# Patient Record
Sex: MALE | Race: WHITE | NOT HISPANIC OR LATINO | Employment: OTHER | URBAN - METROPOLITAN AREA
[De-identification: names, ages, dates, MRNs, and addresses within clinical notes are randomized per-mention and may not be internally consistent; named-entity substitution may affect disease eponyms.]

---

## 2019-11-26 ENCOUNTER — HOSPITAL ENCOUNTER (INPATIENT)
Facility: HOSPITAL | Age: 74
LOS: 1 days | Discharge: HOME | DRG: 352 | End: 2019-11-28
Attending: SURGERY | Admitting: SURGERY
Payer: MEDICARE

## 2019-11-26 ENCOUNTER — ANESTHESIA EVENT (OUTPATIENT)
Dept: SURGERY | Facility: HOSPITAL | Age: 74
DRG: 352 | End: 2019-11-26
Payer: MEDICARE

## 2019-11-26 ENCOUNTER — ANESTHESIA (OUTPATIENT)
Dept: SURGERY | Facility: HOSPITAL | Age: 74
DRG: 352 | End: 2019-11-26
Payer: MEDICARE

## 2019-11-26 DIAGNOSIS — K40.30 INCARCERATED LEFT INGUINAL HERNIA: Primary | ICD-10-CM

## 2019-11-26 LAB
ALBUMIN SERPL BCP-MCNC: 3.9 G/DL (ref 3.5–5.2)
ALP SERPL-CCNC: 85 U/L (ref 55–135)
ALT SERPL W/O P-5'-P-CCNC: 18 U/L (ref 10–44)
ANION GAP SERPL CALC-SCNC: 10 MMOL/L (ref 8–16)
AST SERPL-CCNC: 18 U/L (ref 10–40)
BASOPHILS # BLD AUTO: 0 K/UL (ref 0–0.2)
BASOPHILS NFR BLD: 0 % (ref 0–1.9)
BILIRUB SERPL-MCNC: 0.9 MG/DL (ref 0.1–1)
BUN SERPL-MCNC: 9 MG/DL (ref 8–23)
CALCIUM SERPL-MCNC: 9.5 MG/DL (ref 8.7–10.5)
CHLORIDE SERPL-SCNC: 107 MMOL/L (ref 95–110)
CO2 SERPL-SCNC: 23 MMOL/L (ref 23–29)
CREAT SERPL-MCNC: 0.8 MG/DL (ref 0.5–1.4)
DIFFERENTIAL METHOD: ABNORMAL
EOSINOPHIL # BLD AUTO: 0 K/UL (ref 0–0.5)
EOSINOPHIL NFR BLD: 0 % (ref 0–8)
ERYTHROCYTE [DISTWIDTH] IN BLOOD BY AUTOMATED COUNT: 13 % (ref 11.5–14.5)
EST. GFR  (AFRICAN AMERICAN): >60 ML/MIN/1.73 M^2
EST. GFR  (NON AFRICAN AMERICAN): >60 ML/MIN/1.73 M^2
GLUCOSE SERPL-MCNC: 133 MG/DL (ref 70–110)
HCT VFR BLD AUTO: 38.1 % (ref 40–54)
HGB BLD-MCNC: 12.9 G/DL (ref 14–18)
LYMPHOCYTES # BLD AUTO: 0.5 K/UL (ref 1–4.8)
LYMPHOCYTES NFR BLD: 4 % (ref 18–48)
MCH RBC QN AUTO: 30.5 PG (ref 27–31)
MCHC RBC AUTO-ENTMCNC: 33.9 G/DL (ref 32–36)
MCV RBC AUTO: 90 FL (ref 82–98)
MONOCYTES # BLD AUTO: 0.3 K/UL (ref 0.3–1)
MONOCYTES NFR BLD: 2.5 % (ref 4–15)
NEUTROPHILS # BLD AUTO: 11.2 K/UL (ref 1.8–7.7)
NEUTROPHILS NFR BLD: 93.5 % (ref 38–73)
PLATELET # BLD AUTO: 194 K/UL (ref 150–350)
PMV BLD AUTO: 9.6 FL (ref 9.2–12.9)
POTASSIUM SERPL-SCNC: 3.7 MMOL/L (ref 3.5–5.1)
PROT SERPL-MCNC: 6.6 G/DL (ref 6–8.4)
RBC # BLD AUTO: 4.23 M/UL (ref 4.6–6.2)
SODIUM SERPL-SCNC: 140 MMOL/L (ref 136–145)
WBC # BLD AUTO: 12.02 K/UL (ref 3.9–12.7)

## 2019-11-26 PROCEDURE — 36000706: Performed by: SURGERY

## 2019-11-26 PROCEDURE — 25000003 PHARM REV CODE 250: Performed by: NURSE ANESTHETIST, CERTIFIED REGISTERED

## 2019-11-26 PROCEDURE — 49507 PR REPAIR ING HERNIA,5+Y/O,STRANG: ICD-10-PCS | Mod: LT,,, | Performed by: SURGERY

## 2019-11-26 PROCEDURE — 93010 EKG 12-LEAD: ICD-10-PCS | Mod: ,,, | Performed by: STUDENT IN AN ORGANIZED HEALTH CARE EDUCATION/TRAINING PROGRAM

## 2019-11-26 PROCEDURE — 99219 PR INITIAL OBSERVATION CARE,LEVL II: CPT | Mod: 57,,, | Performed by: SURGERY

## 2019-11-26 PROCEDURE — 96361 HYDRATE IV INFUSION ADD-ON: CPT | Performed by: SURGERY

## 2019-11-26 PROCEDURE — 49507 PRP I/HERN INIT BLOCK >5 YR: CPT | Mod: LT,,, | Performed by: SURGERY

## 2019-11-26 PROCEDURE — 93010 ELECTROCARDIOGRAM REPORT: CPT | Mod: ,,, | Performed by: STUDENT IN AN ORGANIZED HEALTH CARE EDUCATION/TRAINING PROGRAM

## 2019-11-26 PROCEDURE — 63600175 PHARM REV CODE 636 W HCPCS: Performed by: STUDENT IN AN ORGANIZED HEALTH CARE EDUCATION/TRAINING PROGRAM

## 2019-11-26 PROCEDURE — 96374 THER/PROPH/DIAG INJ IV PUSH: CPT | Mod: 59 | Performed by: SURGERY

## 2019-11-26 PROCEDURE — 37000008 HC ANESTHESIA 1ST 15 MINUTES: Performed by: SURGERY

## 2019-11-26 PROCEDURE — C1781 MESH (IMPLANTABLE): HCPCS | Performed by: SURGERY

## 2019-11-26 PROCEDURE — C1729 CATH, DRAINAGE: HCPCS | Performed by: SURGERY

## 2019-11-26 PROCEDURE — 63600175 PHARM REV CODE 636 W HCPCS: Performed by: SURGERY

## 2019-11-26 PROCEDURE — G0378 HOSPITAL OBSERVATION PER HR: HCPCS

## 2019-11-26 PROCEDURE — 80053 COMPREHEN METABOLIC PANEL: CPT

## 2019-11-26 PROCEDURE — 36415 COLL VENOUS BLD VENIPUNCTURE: CPT

## 2019-11-26 PROCEDURE — 71000033 HC RECOVERY, INTIAL HOUR: Performed by: SURGERY

## 2019-11-26 PROCEDURE — S0020 INJECTION, BUPIVICAINE HYDRO: HCPCS | Performed by: SURGERY

## 2019-11-26 PROCEDURE — 85025 COMPLETE CBC W/AUTO DIFF WBC: CPT

## 2019-11-26 PROCEDURE — G0379 DIRECT REFER HOSPITAL OBSERV: HCPCS

## 2019-11-26 PROCEDURE — 37000009 HC ANESTHESIA EA ADD 15 MINS: Performed by: SURGERY

## 2019-11-26 PROCEDURE — 99219 PR INITIAL OBSERVATION CARE,LEVL II: ICD-10-PCS | Mod: 57,,, | Performed by: SURGERY

## 2019-11-26 PROCEDURE — 36000707: Performed by: SURGERY

## 2019-11-26 PROCEDURE — 25000003 PHARM REV CODE 250: Performed by: SURGERY

## 2019-11-26 PROCEDURE — 93005 ELECTROCARDIOGRAM TRACING: CPT

## 2019-11-26 PROCEDURE — 63600175 PHARM REV CODE 636 W HCPCS: Performed by: NURSE ANESTHETIST, CERTIFIED REGISTERED

## 2019-11-26 DEVICE — MESH PARIETEX PROGRIP LEFT: Type: IMPLANTABLE DEVICE | Site: GROIN | Status: FUNCTIONAL

## 2019-11-26 RX ORDER — NEOSTIGMINE METHYLSULFATE 1 MG/ML
INJECTION, SOLUTION INTRAVENOUS
Status: DISCONTINUED | OUTPATIENT
Start: 2019-11-26 | End: 2019-11-26

## 2019-11-26 RX ORDER — LIDOCAINE HCL/PF 100 MG/5ML
SYRINGE (ML) INTRAVENOUS
Status: DISCONTINUED | OUTPATIENT
Start: 2019-11-26 | End: 2019-11-26

## 2019-11-26 RX ORDER — HYDROMORPHONE HYDROCHLORIDE 2 MG/ML
0.5 INJECTION, SOLUTION INTRAMUSCULAR; INTRAVENOUS; SUBCUTANEOUS EVERY 5 MIN PRN
Status: DISCONTINUED | OUTPATIENT
Start: 2019-11-26 | End: 2019-11-26

## 2019-11-26 RX ORDER — SODIUM CHLORIDE, SODIUM LACTATE, POTASSIUM CHLORIDE, CALCIUM CHLORIDE 600; 310; 30; 20 MG/100ML; MG/100ML; MG/100ML; MG/100ML
INJECTION, SOLUTION INTRAVENOUS CONTINUOUS
Status: DISCONTINUED | OUTPATIENT
Start: 2019-11-26 | End: 2019-11-27

## 2019-11-26 RX ORDER — PROPOFOL 10 MG/ML
VIAL (ML) INTRAVENOUS
Status: DISCONTINUED | OUTPATIENT
Start: 2019-11-26 | End: 2019-11-26

## 2019-11-26 RX ORDER — BUPIVACAINE HYDROCHLORIDE 5 MG/ML
INJECTION, SOLUTION EPIDURAL; INTRACAUDAL
Status: DISCONTINUED | OUTPATIENT
Start: 2019-11-26 | End: 2019-11-26 | Stop reason: HOSPADM

## 2019-11-26 RX ORDER — MORPHINE SULFATE 4 MG/ML
2 INJECTION, SOLUTION INTRAMUSCULAR; INTRAVENOUS EVERY 4 HOURS PRN
Status: DISCONTINUED | OUTPATIENT
Start: 2019-11-26 | End: 2019-11-27

## 2019-11-26 RX ORDER — GLYCOPYRROLATE 0.2 MG/ML
INJECTION INTRAMUSCULAR; INTRAVENOUS
Status: DISCONTINUED | OUTPATIENT
Start: 2019-11-26 | End: 2019-11-26

## 2019-11-26 RX ORDER — SODIUM CHLORIDE 0.9 % (FLUSH) 0.9 %
3 SYRINGE (ML) INJECTION
Status: DISCONTINUED | OUTPATIENT
Start: 2019-11-26 | End: 2019-11-26

## 2019-11-26 RX ORDER — FENTANYL CITRATE 50 UG/ML
INJECTION, SOLUTION INTRAMUSCULAR; INTRAVENOUS
Status: DISCONTINUED | OUTPATIENT
Start: 2019-11-26 | End: 2019-11-26

## 2019-11-26 RX ORDER — DEXAMETHASONE SODIUM PHOSPHATE 4 MG/ML
INJECTION, SOLUTION INTRA-ARTICULAR; INTRALESIONAL; INTRAMUSCULAR; INTRAVENOUS; SOFT TISSUE
Status: DISCONTINUED | OUTPATIENT
Start: 2019-11-26 | End: 2019-11-26

## 2019-11-26 RX ORDER — TALC
6 POWDER (GRAM) TOPICAL NIGHTLY PRN
Status: DISCONTINUED | OUTPATIENT
Start: 2019-11-26 | End: 2019-11-28 | Stop reason: HOSPADM

## 2019-11-26 RX ORDER — SODIUM CHLORIDE, SODIUM LACTATE, POTASSIUM CHLORIDE, CALCIUM CHLORIDE 600; 310; 30; 20 MG/100ML; MG/100ML; MG/100ML; MG/100ML
INJECTION, SOLUTION INTRAVENOUS CONTINUOUS PRN
Status: DISCONTINUED | OUTPATIENT
Start: 2019-11-26 | End: 2019-11-26

## 2019-11-26 RX ORDER — ACETAMINOPHEN 500 MG
1000 TABLET ORAL EVERY 8 HOURS
Status: DISCONTINUED | OUTPATIENT
Start: 2019-11-26 | End: 2019-11-28 | Stop reason: HOSPADM

## 2019-11-26 RX ORDER — ACETAMINOPHEN 10 MG/ML
INJECTION, SOLUTION INTRAVENOUS
Status: DISCONTINUED | OUTPATIENT
Start: 2019-11-26 | End: 2019-11-26

## 2019-11-26 RX ORDER — KETOROLAC TROMETHAMINE 30 MG/ML
INJECTION, SOLUTION INTRAMUSCULAR; INTRAVENOUS
Status: DISCONTINUED | OUTPATIENT
Start: 2019-11-26 | End: 2019-11-26

## 2019-11-26 RX ORDER — SODIUM CHLORIDE 0.9 % (FLUSH) 0.9 %
10 SYRINGE (ML) INJECTION
Status: DISCONTINUED | OUTPATIENT
Start: 2019-11-26 | End: 2019-11-28 | Stop reason: HOSPADM

## 2019-11-26 RX ORDER — ROCURONIUM BROMIDE 10 MG/ML
INJECTION, SOLUTION INTRAVENOUS
Status: DISCONTINUED | OUTPATIENT
Start: 2019-11-26 | End: 2019-11-26

## 2019-11-26 RX ORDER — DIPHENHYDRAMINE HYDROCHLORIDE 50 MG/ML
12.5 INJECTION INTRAMUSCULAR; INTRAVENOUS EVERY 6 HOURS PRN
Status: DISCONTINUED | OUTPATIENT
Start: 2019-11-26 | End: 2019-11-26

## 2019-11-26 RX ORDER — SUCCINYLCHOLINE CHLORIDE 20 MG/ML
INJECTION INTRAMUSCULAR; INTRAVENOUS
Status: DISCONTINUED | OUTPATIENT
Start: 2019-11-26 | End: 2019-11-26

## 2019-11-26 RX ORDER — ONDANSETRON 2 MG/ML
INJECTION INTRAMUSCULAR; INTRAVENOUS
Status: DISCONTINUED | OUTPATIENT
Start: 2019-11-26 | End: 2019-11-26

## 2019-11-26 RX ORDER — OXYCODONE HYDROCHLORIDE 5 MG/1
5 TABLET ORAL EVERY 4 HOURS PRN
Status: DISCONTINUED | OUTPATIENT
Start: 2019-11-26 | End: 2019-11-28 | Stop reason: HOSPADM

## 2019-11-26 RX ORDER — ONDANSETRON 2 MG/ML
8 INJECTION INTRAMUSCULAR; INTRAVENOUS EVERY 6 HOURS PRN
Status: DISCONTINUED | OUTPATIENT
Start: 2019-11-26 | End: 2019-11-28 | Stop reason: HOSPADM

## 2019-11-26 RX ORDER — ONDANSETRON 2 MG/ML
4 INJECTION INTRAMUSCULAR; INTRAVENOUS DAILY PRN
Status: DISCONTINUED | OUTPATIENT
Start: 2019-11-26 | End: 2019-11-26

## 2019-11-26 RX ADMIN — PROPOFOL 200 MG: 10 INJECTION, EMULSION INTRAVENOUS at 06:11

## 2019-11-26 RX ADMIN — FENTANYL CITRATE 100 MCG: 50 INJECTION, SOLUTION INTRAMUSCULAR; INTRAVENOUS at 06:11

## 2019-11-26 RX ADMIN — FENTANYL CITRATE 50 MCG: 50 INJECTION, SOLUTION INTRAMUSCULAR; INTRAVENOUS at 06:11

## 2019-11-26 RX ADMIN — ONDANSETRON 8 MG: 2 INJECTION INTRAMUSCULAR; INTRAVENOUS at 05:11

## 2019-11-26 RX ADMIN — SUCCINYLCHOLINE CHLORIDE 120 MG: 20 INJECTION, SOLUTION INTRAMUSCULAR; INTRAVENOUS at 06:11

## 2019-11-26 RX ADMIN — GLYCOPYRROLATE 0.8 MG: 0.2 INJECTION, SOLUTION INTRAMUSCULAR; INTRAVENOUS at 07:11

## 2019-11-26 RX ADMIN — ROCURONIUM BROMIDE 5 MG: 10 INJECTION, SOLUTION INTRAVENOUS at 06:11

## 2019-11-26 RX ADMIN — ONDANSETRON 4 MG: 2 INJECTION, SOLUTION INTRAMUSCULAR; INTRAVENOUS at 07:11

## 2019-11-26 RX ADMIN — FENTANYL CITRATE 50 MCG: 50 INJECTION, SOLUTION INTRAMUSCULAR; INTRAVENOUS at 07:11

## 2019-11-26 RX ADMIN — PROPOFOL 30 MG: 10 INJECTION, EMULSION INTRAVENOUS at 07:11

## 2019-11-26 RX ADMIN — DEXTROSE 2 G: 50 INJECTION, SOLUTION INTRAVENOUS at 06:11

## 2019-11-26 RX ADMIN — SODIUM CHLORIDE, SODIUM LACTATE, POTASSIUM CHLORIDE, AND CALCIUM CHLORIDE: .6; .31; .03; .02 INJECTION, SOLUTION INTRAVENOUS at 06:11

## 2019-11-26 RX ADMIN — GLYCOPYRROLATE 0.2 MG: 0.2 INJECTION, SOLUTION INTRAMUSCULAR; INTRAVENOUS at 06:11

## 2019-11-26 RX ADMIN — KETOROLAC TROMETHAMINE 30 MG: 30 INJECTION, SOLUTION INTRAMUSCULAR; INTRAVENOUS at 07:11

## 2019-11-26 RX ADMIN — DEXAMETHASONE SODIUM PHOSPHATE 8 MG: 4 INJECTION, SOLUTION INTRAMUSCULAR; INTRAVENOUS at 06:11

## 2019-11-26 RX ADMIN — NEOSTIGMINE METHYLSULFATE 5 MG: 1 INJECTION INTRAVENOUS at 07:11

## 2019-11-26 RX ADMIN — SODIUM CHLORIDE, SODIUM LACTATE, POTASSIUM CHLORIDE, AND CALCIUM CHLORIDE: .6; .31; .03; .02 INJECTION, SOLUTION INTRAVENOUS at 05:11

## 2019-11-26 RX ADMIN — LIDOCAINE HYDROCHLORIDE 100 MG: 20 INJECTION, SOLUTION INTRAVENOUS at 06:11

## 2019-11-26 RX ADMIN — ROCURONIUM BROMIDE 45 MG: 10 INJECTION, SOLUTION INTRAVENOUS at 06:11

## 2019-11-26 RX ADMIN — ACETAMINOPHEN 1000 MG: 10 INJECTION, SOLUTION INTRAVENOUS at 06:11

## 2019-11-26 NOTE — NURSING
Patient transported in stretcher downstairs for surgery. IV clean, dry, and intact and saline locked.

## 2019-11-26 NOTE — ANESTHESIA PREPROCEDURE EVALUATION
11/26/2019  Miguel Mckee is a 74 y.o., male presents for hernia repair under GA.    No past medical history on file.  Past Surgical History:   Procedure Laterality Date    APPENDECTOMY      NEPHRECTOMY Left          Anesthesia Evaluation    I have reviewed the Patient Summary Reports.    I have reviewed the Nursing Notes.   I have reviewed the Medications.     Review of Systems  Anesthesia Hx:  No problems with previous Anesthesia   Denies Personal Hx of Anesthesia complications.   Social:  Non-Smoker    Cardiovascular:   Exercise tolerance: good Hypertension hyperlipidemia    Pulmonary:  Pulmonary Normal    Renal/:   BPH S/p left nephrectomy   Hepatic/GI:   GERD, well controlled    Neurological:  Neurology Normal    Endocrine:   Hypothyroidism        Physical Exam  General:  Well nourished    Airway/Jaw/Neck:  Airway Findings: Mouth Opening: Normal Tongue: Normal  General Airway Assessment: Adult  Mallampati: III  Improves to II with phonation.  TM Distance: Normal, at least 6 cm      Dental:  Dental Findings:    Chest/Lungs:  Chest/Lungs Clear    Heart/Vascular:  Heart Findings: Normal       Mental Status:  Mental Status Findings:  Cooperative, Alert and Oriented         Anesthesia Plan  Type of Anesthesia, risks & benefits discussed:  Anesthesia Type:  general  Patient's Preference:   Intra-op Monitoring Plan: standard ASA monitors  Intra-op Monitoring Plan Comments:   Post Op Pain Control Plan: per primary service following discharge from PACU and multimodal analgesia  Post Op Pain Control Plan Comments:   Induction:   IV  Beta Blocker:         Informed Consent: Patient understands risks and agrees with Anesthesia plan.  Questions answered. Anesthesia consent signed with patient.  ASA Score: 3     Day of Surgery Review of History & Physical:  There are no significant changes.  H&P update referred to  the surgeon.         Ready For Surgery From Anesthesia Perspective.

## 2019-11-26 NOTE — NURSING
Patient arrived to floor in stretcher via EMS from [facility]. Patient AAOx4 and denies any pain. IV clean, dry, and intact. VSS. Spouse at bedside. Bed alarm on, bed locked and low, side rails up x2, and call bell in reach. Yellow fall risk band and nonskid socks placed on patient. Patient oriented to room and call santoro usage. Dr. Mathew notified about patient arrival. Telephone orders taken.

## 2019-11-26 NOTE — NURSING
Received report from KEREN Rucker at [facility]. Awaiting patient arrival via EMS to Room 421.

## 2019-11-26 NOTE — H&P
[facility]  INPATIENT H&P    REASON FOR CONSULT/ADMISSION:  Incarcerated left inguinal hernia    HPI: Miguel Mckee is a 74 y.o. male who presented to [facility] with a 1 day history of left inguinal groin pain and nausea vomiting.  In the emergency room they obtained a CT scan which showed incarcerated left inguinal hernia with questionable strangulation and compromise of the small bowel. Transfer was arranged to [facility] for general surgery evaluation.    Upon presentation the patient is complaining of nausea pain in the left groin.  His last bowel movement was yesterday and he does not remember passing flatus.  He has had no p.o. intake today except for taking his home medications this morning.    I have reviewed the patient's chart including prior progress notes, procedures and testing. The patient reports a history of nephrectomy for benign reasons through a left flank incision and an open appendectomy.  He has never had surgery for inguinal hernias in the past.  Had a colonoscopy many years ago which he reports was normal.    ROS:   Review of Systems   Constitutional: Positive for appetite change. Negative for activity change, chills and fever.   HENT: Negative for congestion, nosebleeds and trouble swallowing.    Eyes: Negative for photophobia, discharge and visual disturbance.   Respiratory: Negative for apnea, chest tightness and shortness of breath.    Cardiovascular: Negative for chest pain, palpitations and leg swelling.   Gastrointestinal: Positive for abdominal distention, abdominal pain, constipation and nausea. Negative for vomiting.   Genitourinary: Negative for difficulty urinating, dysuria and hematuria.   Musculoskeletal: Negative for arthralgias, gait problem, neck pain and neck stiffness.   Skin: Negative for color change, pallor, rash and wound.   Neurological: Negative for seizures, syncope and light-headedness.   Psychiatric/Behavioral: Negative for agitation,  behavioral problems and confusion.       PROBLEM LIST:  Patient Active Problem List   Diagnosis    Incarcerated left inguinal hernia         HISTORY  No past medical history on file.    No past surgical history on file.    Social History     Tobacco Use    Smoking status: Not on file   Substance Use Topics    Alcohol use: Not on file    Drug use: Not on file       No family history on file.      MEDS:  No current facility-administered medications on file prior to encounter.      No current outpatient medications on file prior to encounter.       ALLERGIES:  Review of patient's allergies indicates:  No Known Allergies      VITALS:  Temp:  [98.1 °F (36.7 °C)-98.2 °F (36.8 °C)] 98.2 °F (36.8 °C)  Pulse:  [77-93] 80  Resp:  [19-20] 19  SpO2:  [94 %-97 %] 94 %  BP: (139-166)/(66-74) 166/74    No intake/output data recorded.      PHYSICAL EXAM:  Physical Exam   Constitutional: He is oriented to person, place, and time. He appears well-developed and well-nourished. No distress.   HENT:   Head: Normocephalic and atraumatic.   Nose: Nose normal.   Eyes: Conjunctivae and EOM are normal. No scleral icterus.   Neck: Normal range of motion. Neck supple. No tracheal deviation present.   Cardiovascular: Normal rate, regular rhythm and intact distal pulses.   Pulmonary/Chest: Effort normal and breath sounds normal. No stridor. No respiratory distress.   Abdominal: Soft. He exhibits distension (Mild). He exhibits no ascites. There is tenderness ( left lower quadrant, no rebound, no guarding no peritonitis). A hernia (reducible left inguinal hernia with significant tenderness palpation, no overlying skin changes) is present.   Musculoskeletal: Normal range of motion. He exhibits no edema or deformity.   Neurological: He is alert and oriented to person, place, and time. He is not disoriented. He exhibits normal muscle tone.   Skin: Skin is warm and dry. He is not diaphoretic. No erythema.   Psychiatric: He has a normal mood and  affect. His behavior is normal. Judgment and thought content normal.   Vitals reviewed.        LABS:  No results found for: WBC, RBC, HGB, HCT, PLT  No results found for: GLU, NA, K, CL, CO2, BUN, CREATININE, CALCIUM  No results found for: ALT, AST, GGT, ALKPHOS, BILITOT  No results found for: MG, PHOS    STUDIES:  Outside CT images and reports were personally reviewed.    Upon personal review the patient has sigmoid colon herniating through left inguinal hernia defect with some proximal inflammation.  There is no pneumatosis or evidence of perforation.      ASSESSMENT & PLAN:  74 y.o. male with incarcerated left inguinal hernia containing sigmoid colon  - admit to General surgery  - will plan for urgent open left inguinal hernia repair with possible bowel resection  - NPO  - IV fluids  - NG tube  - antiemetics  - SCDs  - incentive spirometer  - plan to restart home medications tomorrow

## 2019-11-26 NOTE — NURSING
Spoke with Dr. Mathew regarding patient going down for surgery without results of xray for NG tube placement. Physician states it's okay for patient to go to surgery with pending xray results.

## 2019-11-27 LAB
ANION GAP SERPL CALC-SCNC: 9 MMOL/L (ref 8–16)
BASOPHILS # BLD AUTO: 0 K/UL (ref 0–0.2)
BASOPHILS NFR BLD: 0 % (ref 0–1.9)
BUN SERPL-MCNC: 11 MG/DL (ref 8–23)
CALCIUM SERPL-MCNC: 9.4 MG/DL (ref 8.7–10.5)
CHLORIDE SERPL-SCNC: 107 MMOL/L (ref 95–110)
CO2 SERPL-SCNC: 23 MMOL/L (ref 23–29)
CREAT SERPL-MCNC: 1 MG/DL (ref 0.5–1.4)
DIFFERENTIAL METHOD: ABNORMAL
EOSINOPHIL # BLD AUTO: 0 K/UL (ref 0–0.5)
EOSINOPHIL NFR BLD: 0 % (ref 0–8)
ERYTHROCYTE [DISTWIDTH] IN BLOOD BY AUTOMATED COUNT: 13.1 % (ref 11.5–14.5)
EST. GFR  (AFRICAN AMERICAN): >60 ML/MIN/1.73 M^2
EST. GFR  (NON AFRICAN AMERICAN): >60 ML/MIN/1.73 M^2
GLUCOSE SERPL-MCNC: 153 MG/DL (ref 70–110)
HCT VFR BLD AUTO: 38 % (ref 40–54)
HGB BLD-MCNC: 12.6 G/DL (ref 14–18)
LYMPHOCYTES # BLD AUTO: 0.2 K/UL (ref 1–4.8)
LYMPHOCYTES NFR BLD: 1.5 % (ref 18–48)
MAGNESIUM SERPL-MCNC: 1.9 MG/DL (ref 1.6–2.6)
MCH RBC QN AUTO: 30.4 PG (ref 27–31)
MCHC RBC AUTO-ENTMCNC: 33.2 G/DL (ref 32–36)
MCV RBC AUTO: 92 FL (ref 82–98)
MONOCYTES # BLD AUTO: 0.4 K/UL (ref 0.3–1)
MONOCYTES NFR BLD: 2.8 % (ref 4–15)
NEUTROPHILS # BLD AUTO: 12.7 K/UL (ref 1.8–7.7)
NEUTROPHILS NFR BLD: 95.7 % (ref 38–73)
PHOSPHATE SERPL-MCNC: 2.9 MG/DL (ref 2.7–4.5)
PLATELET # BLD AUTO: 215 K/UL (ref 150–350)
PMV BLD AUTO: 9.9 FL (ref 9.2–12.9)
POTASSIUM SERPL-SCNC: 4.3 MMOL/L (ref 3.5–5.1)
RBC # BLD AUTO: 4.15 M/UL (ref 4.6–6.2)
SODIUM SERPL-SCNC: 139 MMOL/L (ref 136–145)
WBC # BLD AUTO: 13.25 K/UL (ref 3.9–12.7)

## 2019-11-27 PROCEDURE — 85025 COMPLETE CBC W/AUTO DIFF WBC: CPT

## 2019-11-27 PROCEDURE — 83735 ASSAY OF MAGNESIUM: CPT

## 2019-11-27 PROCEDURE — 11000001 HC ACUTE MED/SURG PRIVATE ROOM

## 2019-11-27 PROCEDURE — 36415 COLL VENOUS BLD VENIPUNCTURE: CPT

## 2019-11-27 PROCEDURE — 94799 UNLISTED PULMONARY SVC/PX: CPT

## 2019-11-27 PROCEDURE — 25000003 PHARM REV CODE 250: Performed by: SURGERY

## 2019-11-27 PROCEDURE — 63600175 PHARM REV CODE 636 W HCPCS: Performed by: SURGERY

## 2019-11-27 PROCEDURE — 80048 BASIC METABOLIC PNL TOTAL CA: CPT

## 2019-11-27 PROCEDURE — 96361 HYDRATE IV INFUSION ADD-ON: CPT | Performed by: SURGERY

## 2019-11-27 PROCEDURE — 84100 ASSAY OF PHOSPHORUS: CPT

## 2019-11-27 RX ORDER — OXYCODONE HYDROCHLORIDE 5 MG/1
5 TABLET ORAL EVERY 6 HOURS PRN
Qty: 20 TABLET | Refills: 0 | Status: SHIPPED | OUTPATIENT
Start: 2019-11-27 | End: 2019-12-04

## 2019-11-27 RX ORDER — LISINOPRIL 20 MG/1
40 TABLET ORAL DAILY
Status: DISCONTINUED | OUTPATIENT
Start: 2019-11-27 | End: 2019-11-28 | Stop reason: HOSPADM

## 2019-11-27 RX ORDER — ACETAMINOPHEN 500 MG
1000 TABLET ORAL EVERY 8 HOURS
Refills: 0 | COMMUNITY
Start: 2019-11-27 | End: 2019-11-30

## 2019-11-27 RX ORDER — AMLODIPINE BESYLATE 5 MG/1
10 TABLET ORAL DAILY
Status: DISCONTINUED | OUTPATIENT
Start: 2019-11-27 | End: 2019-11-28 | Stop reason: HOSPADM

## 2019-11-27 RX ORDER — IBUPROFEN 200 MG
600 TABLET ORAL EVERY 8 HOURS
Refills: 0 | COMMUNITY
Start: 2019-11-27 | End: 2019-11-30

## 2019-11-27 RX ADMIN — ACETAMINOPHEN 1000 MG: 500 TABLET ORAL at 05:11

## 2019-11-27 RX ADMIN — ACETAMINOPHEN 1000 MG: 500 TABLET ORAL at 09:11

## 2019-11-27 RX ADMIN — ACETAMINOPHEN 1000 MG: 500 TABLET ORAL at 01:11

## 2019-11-27 RX ADMIN — IBUPROFEN 600 MG: 400 TABLET ORAL at 01:11

## 2019-11-27 RX ADMIN — IBUPROFEN 600 MG: 400 TABLET ORAL at 07:11

## 2019-11-27 RX ADMIN — SODIUM CHLORIDE, SODIUM LACTATE, POTASSIUM CHLORIDE, AND CALCIUM CHLORIDE: .6; .31; .03; .02 INJECTION, SOLUTION INTRAVENOUS at 05:11

## 2019-11-27 RX ADMIN — AMLODIPINE BESYLATE 10 MG: 5 TABLET ORAL at 01:11

## 2019-11-27 RX ADMIN — LISINOPRIL 40 MG: 20 TABLET ORAL at 01:11

## 2019-11-27 RX ADMIN — IBUPROFEN 600 MG: 400 TABLET ORAL at 09:11

## 2019-11-27 NOTE — PLAN OF CARE
met with patient and significant other, Adriana Downing, [phone],  to complete discharge assessment. Patient was alert and oriented. Prior to admission patient was independent. Patient is currently employed with [employer]. Patient lives with his significant other, Adriana, at [address] [place] [address] , [id number]. Ms. Wright is primary help at home. Additional emergency contact is , Kayla Carrasquillo, step-daughter, [phone]. Patient does have medical equipment in the home (BSC, walker, cane, and bath bench) , no  services.     Patient has no social needs at this time. Patient has no issues affording medications. SW provided patient with discharge brochure and wrote contact information on the whiteboard. SW encouraged patient to contact if any needs or issues arise. Patient verbalized understanding.      11/27/19 1023   Discharge Assessment   Assessment Type Discharge Planning Assessment   Confirmed/corrected address and phone number on facesheet? Yes   Assessment information obtained from? Caregiver;Patient   Prior to hospitilization cognitive status: Alert/Oriented   Prior to hospitalization functional status: Independent   Current cognitive status: Alert/Oriented   Current Functional Status: Independent   Lives With significant other  (Adriana Carrasquillo,significant [phone])   Able to Return to Prior Arrangements yes   Is patient able to care for self after discharge? Yes   Who are your caregiver(s) and their phone number(s)? Adriana Carrasquillo, [phone] (significant other)    Patient's perception of discharge disposition admitted as an inpatient   Readmission Within the Last 30 Days no previous admission in last 30 days   Patient currently being followed by outpatient case management? No   Patient currently receives any other outside agency services? No   Equipment Currently Used at Home walker, rolling;bath bench;cane, straight;bedside commode   Do you have any problems affording any of  your prescribed medications? No   Is the patient taking medications as prescribed? yes   Does the patient have transportation home? Yes   Transportation Anticipated family or friend will provide   Does the patient receive services at the [facility]? No   Discharge Plan A Home   Discharge Plan B Home with family   DME Needed Upon Discharge  none   Patient/Family in Agreement with Plan yes

## 2019-11-27 NOTE — PLAN OF CARE
Patient AAOX4. Plan of care reviewed and patient verbalized understanding. O2 sats are at 97% on room air. No tele. Surgical dressing to lower abd. Dressing clean, dry, and intact. No drainage. Denies any pain. LR infusing at 125mL/hr. Bed in lowest position, call light within reach. Fall precautions maintained. Will continue to monitor.

## 2019-11-27 NOTE — PROGRESS NOTES
Pharmacy New Medication Education    Patient and/or Caregiver ACCEPTED medication education.    Pharmacy has provided education on the name, indication, and possible side effects of the medication(s) prescribed, using teach-back method.     Learners of pharmacy medication education includes:  Patient and spouse    The following medications have also been discussed, during this admission.     Current Facility-Administered Medications   Medication Frequency    acetaminophen tablet 1,000 mg Q8H    ibuprofen tablet 600 mg Q8H    melatonin tablet 6 mg Nightly PRN    ondansetron injection 8 mg Q6H PRN    oxyCODONE immediate release tablet 5 mg Q4H PRN    sodium chloride 0.9% flush 10 mL PRN          Thank you  Mack Hebert, PharmD

## 2019-11-27 NOTE — OP NOTE
DATE OF PROCEDURE: 11/26/2019    PREOPERATIVE DIAGNOSIS:  Incarcerated left inguinal hernia    POSTOPERATIVE DIAGNOSIS:  Incarcerated left indirect inguinal hernia, cord lipoma    PROCEDURE:  Open left inguinal hernia repair with mesh    SURGEON: Xander Mathew M.D    ASSISTANT: None    ANESTHESIA:  General    ESTIMATED BLOOD LOSS:  40 cc    SPECIMEN:  None    CONDITION:  Stable    COMPLICATIONS: None    FINDINGS:   1.  A large indirect left inguinal hernia containing sigmoid colon was present, bowel viable, sac open and attachments to the hernia sac were freed and bowel was able to be reduced, ligation of the sac performed  2.  Large cord lipoma present, high ligation performed  3.  Tension-free repair with Pro  mesh performed    INDICATIONS: The patient is a 74-year-old male presented outside hospital with acute onset of left groin pain with associated nausea vomiting.  He was diagnosed with a left incarcerated hernia on exam and CT scan.  Transfer was arranged to [facility] for general surgery evaluation.  The patient was counseled on his diagnosis and treatment options and agreed to proceed with urgent open left inguinal hernia repair.    PROCEDURE IN DETAIL:  Informed consent was obtained.  The patient taken the operating room placed in supine position where general endotracheal intubation was achieved.  Received 2 g of Ancef.  He was noted have a left inguinal bulge.  We did not attempt to reduce it prior to his surgery in order to evaluate the bowel intraoperatively his left groin and lower abdomen were prepped and draped in typical sterile fashion.  Time-out performed by all members of the operative team.  We injected local anesthetic the made a oblique incision over the left inguinal canal.  Skin was incised sharply and Bovie was used to dissect down through subcutaneous tissue through Radha's fascia until we encountered external oblique aponeurosis.  Crossing veins were cauterized during the  dissection the external oblique aponeurosis was cleared and a stab incision was made in the superior lateral aspect.  Metzenbaum scissors used to elevate the external oblique and transected towards the external ring.  Were then able to clear way the superior and inferior flaps of the external oblique aponeurosis. Patient was noted to have a large hernia containing what appeared to be colon.  He was placed in Trendelenburg position.  There is no obvious turbid fluid or evidence of necrosis that could be visualized through the hernia sac.  We then gained circumferential control of the hernia sac and cord structures at the level of the pubic tubercle.  Penrose drain was placed.  Were then able to separate the hernia sac from the vas deferens in the venous plexus.  These were preserved. It was also noted patient had a large cord lipoma present as well. Once we cleared off the attachments of the sac down to the internal ring carefully incised.  There is no turbid fluid and no evidence of bowel compromise and.  The patient had sigmoid colon which was adhesed into the sac.  We carefully  sharply the colon and its mesentery from the hernia sac.  Once we did this were able to completely reduce it into the peritoneal cavity.  Were then able to perform high ligation of the sac with 2-0 silk tie.  The sac was transected and the stump returned into the peritoneal cavity.  We then performed high ligation of the cord lipoma and a similar fashion. We assured we had adequate hemostasis. The wound was irrigated.  We then used a left sided Pro  preformed mesh.  This was tacked to the pubic tubercle with a 0 Vicryl suture.  His also tacked twice along the ilioinguinal ligament and once on the superior shelving edge.  We assured that the mesh lay flat and have good reapproximation and recreation of the internal ring.  We then closed the external oblique aponeurosis with a running 2 -0 Vicryl suture. Radah's was closed with  interrupted 3-0 Vicryl sutures.  The dermis was reapproximated with 3-0 Vicryl sutures.  Skin was closed with a running 4-0 Monocryl subcuticular stitch and glue.  Pressure dressing was applied to the area postoperatively and supportive underwear was applied.  The testicle was assured to be returned into the scrotum.  Patient was then aroused from sedation extubated taken to recovery room in stable condition have suffered no complications.  All counts correct x2 than the case. I was present scrubbed throughout the case.    DISPO:  To PACU then return to floor

## 2019-11-27 NOTE — PROGRESS NOTES
[facility]  PROGRESS NOTE    HPI: Miguel Mckee is a 74 y.o. male admitted for incarcerated left inguinal hernia.    INTERVAL HISTORY:  Underwent urgent open left inguinal hernia repair with mesh yesterday evening.  Bowel found to be viable.  Overnight patient has done well. Minimal pain.  Tolerating clears without nausea or vomiting.  No flatus or BM.    VITALS:  Temp:  [98.1 °F (36.7 °C)-98.4 °F (36.9 °C)] 98.3 °F (36.8 °C)  Pulse:  [77-93] 81  Resp:  [15-20] 18  SpO2:  [93 %-97 %] 96 %  BP: (120-166)/(58-74) 139/65    I&Os:  I/O last 3 completed shifts:  In: 2568.8 [P.O.:100; I.V.:2468.8]  Out: 410 [Urine:410]    PHYSICAL EXAM:  GEN:  No acute distress, alert orient x3  HEENT:  Anicteric sclera  CV:  Regular rate rhythm  RESP:  Nonlabored breathing  ABD:  Soft, nontender, nondistended, left lower abdomen incision with bandage in place without any soilage  EXT:  No edema    LABS:  CBC:   Recent Labs   Lab 11/27/19  0351   WBC 13.25*   RBC 4.15*   HGB 12.6*   HCT 38.0*      MCV 92   MCH 30.4   MCHC 33.2     BMP:   Recent Labs   Lab 11/27/19  0351   *      K 4.3      CO2 23   BUN 11   CREATININE 1.0   CALCIUM 9.4     Labs within the past 24 hours have been reviewed.      ASSESSMENT & PLAN:  74 y.o. male status post open left inguinal hernia repair for incarcerated hernia  - postoperatively doing well  - advanced from clears to regular diet at lunch, stop IV fluids  - scheduled Tylenol and ibuprofen, p.r.n. pain medication  - encourage incentive spirometer and out of bed to chair  - likely discharge home this afternoon

## 2019-11-27 NOTE — ANESTHESIA POSTPROCEDURE EVALUATION
Anesthesia Post Evaluation    Patient: Miguel Mckee    Procedure(s) Performed: Procedure(s) (LRB):  REPAIR, HERNIA, INGUINAL, WITHOUT HISTORY OF PRIOR REPAIR, AGE 5 YEARS OR OLDER (Left)    Final Anesthesia Type: general    Patient location during evaluation: ICU  Patient participation: Yes- Able to Participate  Level of consciousness: awake and alert  Post-procedure vital signs: reviewed and stable  Pain management: adequate  Airway patency: patent  YAMILEX mitigation strategies: Multimodal analgesia  PONV status at discharge: No PONV  Anesthetic complications: no      Cardiovascular status: hemodynamically stable and blood pressure returned to baseline  Respiratory status: spontaneous ventilation  Hydration status: euvolemic  Follow-up not needed.          Vitals Value Taken Time   /74 11/26/2019  4:14 PM   Temp 36.8 °C (98.2 °F) 11/26/2019  4:14 PM   Pulse 80 11/26/2019  4:14 PM   Resp 19 11/26/2019  4:14 PM   SpO2 94 % 11/26/2019  4:14 PM         No case tracking events are documented in the log.      Pain/Guerline Score: No data recorded

## 2019-11-27 NOTE — ANESTHESIA POSTPROCEDURE EVALUATION
Anesthesia Post Evaluation    Patient: Miguel Mckee    Procedure(s) Performed: Procedure(s) (LRB):  REPAIR, HERNIA, INGUINAL, WITHOUT HISTORY OF PRIOR REPAIR, AGE 5 YEARS OR OLDER (Left)    Final Anesthesia Type: general    Patient location during evaluation: PACU  Patient participation: Yes- Able to Participate  Level of consciousness: awake and alert and oriented  Post-procedure vital signs: reviewed and stable  Pain management: adequate  Airway patency: patent    PONV status at discharge: No PONV  Anesthetic complications: no      Cardiovascular status: blood pressure returned to baseline, hemodynamically stable and stable  Respiratory status: unassisted, spontaneous ventilation and room air  Hydration status: euvolemic  Follow-up not needed.          Vitals Value Taken Time   /65 11/26/2019  8:55 PM   Temp 36.7 °C (98.1 °F) 11/26/2019  8:55 PM   Pulse 80 11/26/2019  8:55 PM   Resp 17 11/26/2019  8:55 PM   SpO2 95 % 11/26/2019  8:55 PM         Event Time     Out of Recovery 20:58:06          Pain/Guerline Score: Guerline Score: 10 (11/26/2019  8:56 PM)

## 2019-11-27 NOTE — PLAN OF CARE
Pt on RA with documented sats. 97. Patient achieving 1000 ml on incentive spirometer. Will continue to monitor.

## 2019-11-28 VITALS
SYSTOLIC BLOOD PRESSURE: 159 MMHG | DIASTOLIC BLOOD PRESSURE: 78 MMHG | RESPIRATION RATE: 20 BRPM | TEMPERATURE: 96 F | WEIGHT: 201.06 LBS | OXYGEN SATURATION: 97 % | HEART RATE: 70 BPM

## 2019-11-28 LAB
ANION GAP SERPL CALC-SCNC: 9 MMOL/L (ref 8–16)
BASOPHILS # BLD AUTO: 0.01 K/UL (ref 0–0.2)
BASOPHILS NFR BLD: 0.1 % (ref 0–1.9)
BUN SERPL-MCNC: 12 MG/DL (ref 8–23)
CALCIUM SERPL-MCNC: 9 MG/DL (ref 8.7–10.5)
CHLORIDE SERPL-SCNC: 105 MMOL/L (ref 95–110)
CO2 SERPL-SCNC: 24 MMOL/L (ref 23–29)
CREAT SERPL-MCNC: 0.9 MG/DL (ref 0.5–1.4)
DIFFERENTIAL METHOD: ABNORMAL
EOSINOPHIL # BLD AUTO: 0 K/UL (ref 0–0.5)
EOSINOPHIL NFR BLD: 0.1 % (ref 0–8)
ERYTHROCYTE [DISTWIDTH] IN BLOOD BY AUTOMATED COUNT: 13.3 % (ref 11.5–14.5)
EST. GFR  (AFRICAN AMERICAN): >60 ML/MIN/1.73 M^2
EST. GFR  (NON AFRICAN AMERICAN): >60 ML/MIN/1.73 M^2
GLUCOSE SERPL-MCNC: 108 MG/DL (ref 70–110)
HCT VFR BLD AUTO: 36.2 % (ref 40–54)
HGB BLD-MCNC: 11.9 G/DL (ref 14–18)
LYMPHOCYTES # BLD AUTO: 0.7 K/UL (ref 1–4.8)
LYMPHOCYTES NFR BLD: 6.5 % (ref 18–48)
MAGNESIUM SERPL-MCNC: 1.9 MG/DL (ref 1.6–2.6)
MCH RBC QN AUTO: 30.5 PG (ref 27–31)
MCHC RBC AUTO-ENTMCNC: 32.9 G/DL (ref 32–36)
MCV RBC AUTO: 93 FL (ref 82–98)
MONOCYTES # BLD AUTO: 0.7 K/UL (ref 0.3–1)
MONOCYTES NFR BLD: 6.9 % (ref 4–15)
NEUTROPHILS # BLD AUTO: 8.8 K/UL (ref 1.8–7.7)
NEUTROPHILS NFR BLD: 86.4 % (ref 38–73)
PHOSPHATE SERPL-MCNC: 2.2 MG/DL (ref 2.7–4.5)
PLATELET # BLD AUTO: 182 K/UL (ref 150–350)
PMV BLD AUTO: 9.7 FL (ref 9.2–12.9)
POTASSIUM SERPL-SCNC: 3.9 MMOL/L (ref 3.5–5.1)
RBC # BLD AUTO: 3.9 M/UL (ref 4.6–6.2)
SODIUM SERPL-SCNC: 138 MMOL/L (ref 136–145)
WBC # BLD AUTO: 10.21 K/UL (ref 3.9–12.7)

## 2019-11-28 PROCEDURE — 36415 COLL VENOUS BLD VENIPUNCTURE: CPT

## 2019-11-28 PROCEDURE — 84100 ASSAY OF PHOSPHORUS: CPT

## 2019-11-28 PROCEDURE — 85025 COMPLETE CBC W/AUTO DIFF WBC: CPT

## 2019-11-28 PROCEDURE — G0008 ADMIN INFLUENZA VIRUS VAC: HCPCS | Performed by: SURGERY

## 2019-11-28 PROCEDURE — 63600175 PHARM REV CODE 636 W HCPCS: Performed by: SURGERY

## 2019-11-28 PROCEDURE — 80048 BASIC METABOLIC PNL TOTAL CA: CPT

## 2019-11-28 PROCEDURE — 25000003 PHARM REV CODE 250: Performed by: SURGERY

## 2019-11-28 PROCEDURE — 90471 IMMUNIZATION ADMIN: CPT | Performed by: SURGERY

## 2019-11-28 PROCEDURE — 90662 IIV NO PRSV INCREASED AG IM: CPT | Performed by: SURGERY

## 2019-11-28 PROCEDURE — 83735 ASSAY OF MAGNESIUM: CPT

## 2019-11-28 RX ADMIN — OXYCODONE HYDROCHLORIDE 5 MG: 5 TABLET ORAL at 09:11

## 2019-11-28 RX ADMIN — IBUPROFEN 600 MG: 400 TABLET ORAL at 05:11

## 2019-11-28 RX ADMIN — LISINOPRIL 40 MG: 20 TABLET ORAL at 09:11

## 2019-11-28 RX ADMIN — INFLUENZA A VIRUS A/MICHIGAN/45/2015 X-275 (H1N1) ANTIGEN (FORMALDEHYDE INACTIVATED), INFLUENZA A VIRUS A/SINGAPORE/INFIMH-16-0019/2016 IVR-186 (H3N2) ANTIGEN (FORMALDEHYDE INACTIVATED), AND INFLUENZA B VIRUS B/MARYLAND/15/2016 BX-69A (A B/COLORADO/6/2017-LIKE VIRUS) ANTIGEN (FORMALDEHYDE INACTIVATED) 0.5 ML: 60; 60; 60 INJECTION, SUSPENSION INTRAMUSCULAR at 11:11

## 2019-11-28 RX ADMIN — ACETAMINOPHEN 1000 MG: 500 TABLET ORAL at 05:11

## 2019-11-28 RX ADMIN — AMLODIPINE BESYLATE 10 MG: 5 TABLET ORAL at 09:11

## 2019-11-28 NOTE — NURSING
Discharge information and education provided, patient voices understanding. IV catheter discontinued, catheter intact. Discharged via wheelchair, wife at bedside. No acute distress noted.

## 2019-11-28 NOTE — PLAN OF CARE
Safety maintained. Q 2 hour rounding for 4Ps. Denies any pain and discomfort. Dressing to Lt Inguinal area C/D/I. Scrotal support in place. VSS. Will continue to monitor.

## 2019-11-28 NOTE — PLAN OF CARE
VN completed admission questions with patient. Plan of care was discussed including VTE prophylaxis of SCDs.  All questions answered.  Education given on safety measures and using call light before getting out of bed by himself. Patient verbalized understanding. Bed locked and in lowest position. Alarm on. Wife at bedside.

## 2019-11-28 NOTE — PLAN OF CARE
11/28/19 1100   Type of Frequent Check   Type Patient Rounds   Safety/Activity   Patient Rounds bed in low position;bed wheels locked;visualized patient   Safety Promotion/Fall Prevention assistive device/personal item within reach;medications reviewed;nonskid shoes/socks when out of bed;side rails raised x 2   Activity Management sitting at edge of bed - L2   Positioning   Body Position positioned/repositioned independently   Pain/Comfort/Sleep   Preferred Pain Scale number (Numeric Rating Pain Scale)   Pain Rating (0-10): Rest 0    Cued into room to complete rounding and discharge on this pt . Pt granted permission to enter. Patient has received discharge instructions. Prescriptions received. Instructions reviewed with pt using teachback method. All questions answered to pt satisfaction. IV access   removed per floor nurse. Transport requested for discharge.

## 2019-11-28 NOTE — PLAN OF CARE
Plan of care reviewed with patient. Patient verbalized understanding. Fall precautions maintained. Bed in lowest position, call light within reach, 2x bed rails, pt wearing slip resistant socks, dressing to L groin CDI. Patient notified to ask staff for assistance and pt verbalized complete understanding.  Will continue to monitor. Pt anticipates discharge.

## 2019-11-28 NOTE — DISCHARGE INSTRUCTIONS
Hernia Repair Surgery (English)      Ibuprofen tablets and capsules (English)      Acetaminophen tablets or caplets (English)      Oxycodone tablets or capsules (English)      Incentive Spirometer, Using an (English)      Influenza Virus Vaccine injection (English)

## 2019-12-01 NOTE — DISCHARGE SUMMARY
[facility]  General Surgery  Discharge Summary      Patient Name: Miguel Mckee  MRN: [id number]  Admission Date: 11/26/2019  Hospital Length of Stay: 1 days  Discharge Date and Time: 11/28/2019  Attending Physician: No att. providers found   Discharging Provider: Xander Mathew Jr, MD  Primary Care Provider: [facility]     HPI: Miguel Mckee is a 74 y.o. male who presented to [facility] with a 1 day history of left inguinal groin pain and nausea vomiting.  In the emergency room they obtained a CT scan which showed incarcerated left inguinal hernia with questionable strangulation and compromise of the small bowel. Transfer was arranged to [facility] for general surgery evaluation.     Upon presentation the patient was complaining of nausea pain in the left groin.  His last bowel movement was yesterday and he does not remember passing flatus.  He has had no p.o. intake today except for taking his home medications this morning.     The patient reports a history of nephrectomy for benign reasons through a left flank incision and an open appendectomy.  He has never had surgery for inguinal hernias in the past.  Had a colonoscopy many years ago which he reports was normal.    Procedure(s) (LRB):  REPAIR, HERNIA, INGUINAL, WITHOUT HISTORY OF PRIOR REPAIR, AGE 5 YEARS OR OLDER (Left)     Hospital Course:  Patient was admitted to General surgery and underwent urgent open left inguinal hernia repair with mesh on 11/26/2019.  At the time patient was found to have portion of the sigmoid colon sliding but the bowel was viable and was able to be reduced.  Postoperatively he was readmitted to the floor.  Patient was advanced to clears which she tolerated well. So meds were restarted.  His pain was well controlled.  His IV fluids were stopped and he was ambulated.  He had not had a bowel movement or pass gas until postop day 2.  His diet was advanced to regular which he  tolerated was subsequently discharged home.    Consults:     Significant Diagnostic Studies: Radiology: CT scan:     Pending Diagnostic Studies:     None        Final Active Diagnoses:    Diagnosis Date Noted POA    PRINCIPAL PROBLEM:  Incarcerated left inguinal hernia [K40.30] 11/26/2019 Yes      Problems Resolved During this Admission:      Discharged Condition: good    Disposition: Home or Self Care    Follow Up:    Patient Instructions:      Diet Adult Regular     Ice to affected area     Lifting restrictions   Order Comments: No lifting greater than 20 lb, no straining, no strenuous activity for 4 weeks     Notify your health care provider if you experience any of the following:  temperature >100.4     Notify your health care provider if you experience any of the following:  persistent nausea and vomiting or diarrhea     Notify your health care provider if you experience any of the following:  severe uncontrolled pain     Notify your health care provider if you experience any of the following:  redness, tenderness, or signs of infection (pain, swelling, redness, odor or green/yellow discharge around incision site)     Notify your health care provider if you experience any of the following:  worsening rash     Remove dressing in 24 hours   Order Comments: - A surgical glue has been placed over your incisions. Please leave the glue in place and do not attempt to remove it.   - It is ok to shower using mild soap and water over the incisions the day after your procedure. Pat dry your incisions. Do not soak in a bath tub or other body of water for 2 weeks or until cleared by your surgeon.   - If you noticed redness, swelling, fever, increasing pain or significant drainage from your wound please call the office or the hospital  after hours.     Shower on day dressing removed (No bath)     Medications:  Reconciled Home Medications:      Medication List      START taking these medications    oxyCODONE 5 MG  immediate release tablet  Commonly known as:  ROXICODONE  Take 1 tablet (5 mg total) by mouth every 6 (six) hours as needed for Pain.        ASK your doctor about these medications    acetaminophen 500 MG tablet  Commonly known as:  TYLENOL  Take 2 tablets (1,000 mg total) by mouth every 8 (eight) hours. for 3 days  Ask about: Should I take this medication?     ibuprofen 200 MG tablet  Commonly known as:  ADVIL,MOTRIN  Take 3 tablets (600 mg total) by mouth every 8 (eight) hours. for 3 days  Ask about: Should I take this medication?            Xander Mathew Jr, MD  General Surgery  [facility]

## 2019-12-26 ENCOUNTER — OFFICE VISIT (OUTPATIENT)
Dept: SURGERY | Facility: CLINIC | Age: 74
End: 2019-12-26
Payer: MEDICARE

## 2019-12-26 VITALS
DIASTOLIC BLOOD PRESSURE: 65 MMHG | HEIGHT: 69 IN | WEIGHT: 208.13 LBS | SYSTOLIC BLOOD PRESSURE: 132 MMHG | TEMPERATURE: 97 F | OXYGEN SATURATION: 96 % | HEART RATE: 89 BPM | BODY MASS INDEX: 30.83 KG/M2

## 2019-12-26 DIAGNOSIS — Z98.890 STATUS POST LEFT INGUINAL HERNIA REPAIR: Primary | ICD-10-CM

## 2019-12-26 DIAGNOSIS — Z87.19 STATUS POST LEFT INGUINAL HERNIA REPAIR: Primary | ICD-10-CM

## 2019-12-26 PROCEDURE — 99999 PR PBB SHADOW E&M-EST. PATIENT-LVL III: ICD-10-PCS | Mod: PBBFAC,,, | Performed by: SURGERY

## 2019-12-26 PROCEDURE — 99213 OFFICE O/P EST LOW 20 MIN: CPT | Mod: PBBFAC,PO | Performed by: SURGERY

## 2019-12-26 PROCEDURE — 99024 POSTOP FOLLOW-UP VISIT: CPT | Mod: POP,,, | Performed by: SURGERY

## 2019-12-26 PROCEDURE — 99999 PR PBB SHADOW E&M-EST. PATIENT-LVL III: CPT | Mod: PBBFAC,,, | Performed by: SURGERY

## 2019-12-26 PROCEDURE — 99024 PR POST-OP FOLLOW-UP VISIT: ICD-10-PCS | Mod: POP,,, | Performed by: SURGERY

## 2019-12-26 NOTE — PROGRESS NOTES
[facility]  Post Op Visit    SUBJECTIVE:  The patient is a 74 y.o. y/o male 3 weeks s/p open LIHR with mesh. Sliding hernia, containing viable sigmoid colon. He denies pain, fevers, chills, nausea, vomiting, diarrhea, or constipation. Eating well with normal appetite and bowel function. Denies redness around or drainage from incisions.    OBJECTIVE:  Vitals:    12/26/19 1106   BP: 132/65   Pulse: 89   Temp: 97 °F (36.1 °C)     GEN: male in NAD  ABD: soft, non-tender, non-distended  INCISIONS: left groin incision healing well without signs of infection    ASSESSMENT/PLAN:  Doing well s/p LIHR with mesh, for incarcerated sliding hernia.   Patient is advised to avoid heavy lifting or strenuous activity for another 3 weeks.   Follow-up PRN.   All questions answered; patient is comfortable with the above follow-up plan.    Diamond Hall MD  General Surgery, PGY-IV

## 2019-12-26 NOTE — LETTER
December 26, 2019    Miguel Mckee  [address]  [place] [address]         [facility]  [address]  [facility] [address]  Phone: [phone] December 26, 2019     Patient: Miguel Mckee   YOB: 1945   Date of Visit: 12/26/2019       To Whom It May Concern:    It is my medical opinion that Miguel Mckee may return to work on 12/27/19. If possible avoid lifting greater than 20 lbs until 1/9/20.    If you have any questions or concerns, please don't hesitate to call.    Sincerely,        Xander Mathew Jr., MD

## 2021-07-28 ENCOUNTER — OUTSIDE PLACE OF SERVICE (OUTPATIENT)
Dept: CARDIOLOGY | Facility: CLINIC | Age: 76
End: 2021-07-28
Payer: MEDICARE

## 2021-07-28 DIAGNOSIS — R55 SYNCOPE AND COLLAPSE: ICD-10-CM

## 2021-07-28 PROCEDURE — 93010 ELECTROCARDIOGRAM REPORT: CPT | Mod: ,,, | Performed by: INTERNAL MEDICINE

## 2021-07-28 PROCEDURE — 93010 PR ELECTROCARDIOGRAM REPORT: ICD-10-PCS | Mod: ,,, | Performed by: INTERNAL MEDICINE

## 2025-01-17 ENCOUNTER — HOSPITAL ENCOUNTER (INPATIENT)
Facility: HOSPITAL | Age: 80
LOS: 3 days | Discharge: HOME | DRG: 280 | End: 2025-01-20
Attending: EMERGENCY MEDICINE | Admitting: INTERNAL MEDICINE
Payer: MEDICARE

## 2025-01-17 DIAGNOSIS — E87.1 HYPONATREMIA: ICD-10-CM

## 2025-01-17 DIAGNOSIS — R06.02 SHORTNESS OF BREATH: ICD-10-CM

## 2025-01-17 DIAGNOSIS — I50.9 ACUTE DECOMPENSATED HEART FAILURE: ICD-10-CM

## 2025-01-17 DIAGNOSIS — I21.4 NSTEMI (NON-ST ELEVATION MYOCARDIAL INFARCTION): ICD-10-CM

## 2025-01-17 DIAGNOSIS — I24.9 ACUTE CORONARY SYNDROME: Primary | ICD-10-CM

## 2025-01-17 DIAGNOSIS — I21.3 STEMI (ST ELEVATION MYOCARDIAL INFARCTION): ICD-10-CM

## 2025-01-17 DIAGNOSIS — R07.9 CHEST PAIN: ICD-10-CM

## 2025-01-17 DIAGNOSIS — I21.4 NON-ST ELEVATION MYOCARDIAL INFARCTION (NSTEMI): ICD-10-CM

## 2025-01-17 PROBLEM — J96.01 ACUTE HYPOXIC RESPIRATORY FAILURE: Status: ACTIVE | Noted: 2025-01-17

## 2025-01-17 PROBLEM — I10 PRIMARY HYPERTENSION: Status: ACTIVE | Noted: 2025-01-17

## 2025-01-17 LAB
ABO + RH BLD: NORMAL
ALBUMIN SERPL BCP-MCNC: 3.5 G/DL (ref 3.5–5.2)
ALLENS TEST: ABNORMAL
ALP SERPL-CCNC: 86 U/L (ref 40–150)
ALT SERPL W/O P-5'-P-CCNC: 31 U/L (ref 10–44)
ANION GAP SERPL CALC-SCNC: 9 MMOL/L (ref 8–16)
APICAL FOUR CHAMBER EJECTION FRACTION: 46 %
APICAL TWO CHAMBER EJECTION FRACTION: 39 %
APTT PPP: 31 SEC (ref 21–32)
APTT PPP: 70 SEC (ref 21–32)
AST SERPL-CCNC: 49 U/L (ref 10–40)
AV INDEX (PROSTH): 0.92
AV MEAN GRADIENT: 3 MMHG
AV PEAK GRADIENT: 5 MMHG
AV VALVE AREA BY VELOCITY RATIO: 3.8 CM²
AV VALVE AREA: 3.5 CM²
AV VELOCITY RATIO: 1
BILIRUB SERPL-MCNC: 0.7 MG/DL (ref 0.1–1)
BLD GP AB SCN CELLS X3 SERPL QL: NORMAL
BNP SERPL-MCNC: 1110 PG/ML (ref 0–99)
BSA FOR ECHO PROCEDURE: 2.06 M2
BUN SERPL-MCNC: 13 MG/DL (ref 8–23)
CALCIUM SERPL-MCNC: 8.8 MG/DL (ref 8.7–10.5)
CHLORIDE SERPL-SCNC: 95 MMOL/L (ref 95–110)
CHOLEST SERPL-MCNC: 174 MG/DL (ref 120–199)
CHOLEST/HDLC SERPL: 3 {RATIO} (ref 2–5)
CO2 SERPL-SCNC: 23 MMOL/L (ref 23–29)
CREAT SERPL-MCNC: 0.9 MG/DL (ref 0.5–1.4)
CV ECHO LV RWT: 0.41 CM
DOP CALC AO PEAK VEL: 1.1 M/S
DOP CALC AO VTI: 17.8 CM
DOP CALC LVOT AREA: 3.8 CM2
DOP CALC LVOT DIAMETER: 2.2 CM
DOP CALC LVOT PEAK VEL: 1.1 M/S
DOP CALC LVOT STROKE VOLUME: 62.3 CM3
DOP CALC MV VTI: 17.1 CM
DOP CALCLVOT PEAK VEL VTI: 16.4 CM
E WAVE DECELERATION TIME: 103 MSEC
E/A RATIO: 0.46
E/E' RATIO: 8 M/S
ECHO LV POSTERIOR WALL: 1 CM (ref 0.6–1.1)
ERYTHROCYTE [DISTWIDTH] IN BLOOD BY AUTOMATED COUNT: 11.8 % (ref 11.5–14.5)
EST. GFR  (NO RACE VARIABLE): >60 ML/MIN/1.73 M^2
ESTIMATED AVG GLUCOSE: 103 MG/DL (ref 68–131)
FIO2: 40 %
FRACTIONAL SHORTENING: 20.4 % (ref 28–44)
GLUCOSE SERPL-MCNC: 108 MG/DL (ref 70–110)
HBA1C MFR BLD: 5.2 % (ref 4–5.6)
HCT VFR BLD AUTO: 39.6 % (ref 40–54)
HDLC SERPL-MCNC: 58 MG/DL (ref 40–75)
HDLC SERPL: 33.3 % (ref 20–50)
HGB BLD-MCNC: 14 G/DL (ref 14–18)
HR MV ECHO: 93 BPM
INR PPP: 1 (ref 0.8–1.2)
INTERVENTRICULAR SEPTUM: 1.7 CM (ref 0.6–1.1)
IVC DIAMETER: 1.69 CM
LACTATE SERPL-SCNC: 0.9 MMOL/L (ref 0.5–2.2)
LDLC SERPL CALC-MCNC: 104 MG/DL (ref 63–159)
LEFT ATRIUM AREA SYSTOLIC (APICAL 2 CHAMBER): 12.9 CM2
LEFT ATRIUM AREA SYSTOLIC (APICAL 4 CHAMBER): 9.08 CM2
LEFT ATRIUM VOLUME INDEX MOD: 12 ML/M2
LEFT ATRIUM VOLUME MOD: 24 ML
LEFT INTERNAL DIMENSION IN SYSTOLE: 3.9 CM (ref 2.1–4)
LEFT VENTRICLE DIASTOLIC VOLUME INDEX: 53.89 ML/M2
LEFT VENTRICLE DIASTOLIC VOLUME: 111.02 ML
LEFT VENTRICLE END DIASTOLIC VOLUME APICAL 2 CHAMBER: 118.44 ML
LEFT VENTRICLE END DIASTOLIC VOLUME APICAL 4 CHAMBER: 125.33 ML
LEFT VENTRICLE END SYSTOLIC VOLUME APICAL 2 CHAMBER: 32.06 ML
LEFT VENTRICLE END SYSTOLIC VOLUME APICAL 4 CHAMBER: 16.4 ML
LEFT VENTRICLE MASS INDEX: 130.1 G/M2
LEFT VENTRICLE SYSTOLIC VOLUME INDEX: 32 ML/M2
LEFT VENTRICLE SYSTOLIC VOLUME: 65.85 ML
LEFT VENTRICULAR INTERNAL DIMENSION IN DIASTOLE: 4.9 CM (ref 3.5–6)
LEFT VENTRICULAR MASS: 267.9 G
LV LATERAL E/E' RATIO: 8.2 M/S
LV SEPTAL E/E' RATIO: 8.2 M/S
LVED V (TEICH): 111.02 ML
LVES V (TEICH): 65.85 ML
LVOT MG: 3.13 MMHG
LVOT MV: 0.85 CM/S
MAGNESIUM SERPL-MCNC: 1.8 MG/DL (ref 1.6–2.6)
MCH RBC QN AUTO: 33.2 PG (ref 27–31)
MCHC RBC AUTO-ENTMCNC: 35.4 G/DL (ref 32–36)
MCV RBC AUTO: 94 FL (ref 82–98)
MV A" WAVE DURATION": 110.37 MSEC
MV MEAN GRADIENT: 2 MMHG
MV PEAK A VEL: 0.9 M/S
MV PEAK E VEL: 0.41 M/S
MV PEAK GRADIENT: 5 MMHG
MV STENOSIS PRESSURE HALF TIME: 29.72 MS
MV VALVE AREA BY CONTINUITY EQUATION: 3.64 CM2
MV VALVE AREA P 1/2 METHOD: 7.4 CM2
NONHDLC SERPL-MCNC: 116 MG/DL
OHS CV RV/LV RATIO: 0.71 CM
PCO2 BLDA: 35.9 MMHG (ref 35–45)
PEEP: 5
PH SMN: 7.42 [PH] (ref 7.35–7.45)
PISA TR MAX VEL: 2.3 M/S
PLATELET # BLD AUTO: 157 K/UL (ref 150–450)
PMV BLD AUTO: 9.9 FL (ref 9.2–12.9)
PO2 BLDA: 164 MMHG (ref 80–100)
POC BASE DEFICIT: -1 MMOL/L (ref -2–2)
POC HCO3: 23.1 MMOL/L (ref 24–28)
POC PERFORMED BY: ABNORMAL
POC SATURATED O2: >100 % (ref 95–100)
POC SET RR: 14
POCT GLUCOSE: 120 MG/DL (ref 70–110)
POTASSIUM SERPL-SCNC: 4 MMOL/L (ref 3.5–5.1)
PROT SERPL-MCNC: 6.6 G/DL (ref 6–8.4)
PROTHROMBIN TIME: 11.9 SEC (ref 9–12.5)
PV MV: 0.75 M/S
PV PEAK GRADIENT: 4 MMHG
PV PEAK VELOCITY: 1.01 M/S
RA PRESSURE ESTIMATED: 3 MMHG
RA VOL SYS: 19.31 ML
RBC # BLD AUTO: 4.22 M/UL (ref 4.6–6.2)
RIGHT ATRIAL AREA: 9.1 CM2
RIGHT ATRIUM VOLUME AREA LENGTH APICAL 4 CHAMBER: 17.51 ML
RIGHT VENTRICLE DIASTOLIC BASEL DIMENSION: 3.5 CM
RV TB RVSP: 5 MMHG
RV TISSUE DOPPLER FREE WALL SYSTOLIC VELOCITY 1 (APICAL 4 CHAMBER VIEW): 14.05 CM/S
SINUS: 3.43 CM
SODIUM SERPL-SCNC: 127 MMOL/L (ref 136–145)
SPECIMEN OUTDATE: NORMAL
SPECIMEN SOURCE: ABNORMAL
STJ: 3.15 CM
TDI LATERAL: 0.05 M/S
TDI SEPTAL: 0.05 M/S
TDI: 0.05 M/S
TR MAX PG: 21 MMHG
TRICUSPID ANNULAR PLANE SYSTOLIC EXCURSION: 1.48 CM
TRIGL SERPL-MCNC: 60 MG/DL (ref 30–150)
TROPONIN I SERPL DL<=0.01 NG/ML-MCNC: 0.87 NG/ML (ref 0–0.03)
TROPONIN I SERPL DL<=0.01 NG/ML-MCNC: 0.89 NG/ML (ref 0–0.03)
TROPONIN I SERPL DL<=0.01 NG/ML-MCNC: 0.9 NG/ML (ref 0–0.03)
TROPONIN I SERPL DL<=0.01 NG/ML-MCNC: 0.94 NG/ML (ref 0–0.03)
TROPONIN I SERPL DL<=0.01 NG/ML-MCNC: 0.95 NG/ML (ref 0–0.03)
TV REST PULMONARY ARTERY PRESSURE: 24 MMHG
VT: 10
WBC # BLD AUTO: 4.29 K/UL (ref 3.9–12.7)
Z-SCORE OF LEFT VENTRICULAR DIMENSION IN END DIASTOLE: -2.39
Z-SCORE OF LEFT VENTRICULAR DIMENSION IN END SYSTOLE: 0.19

## 2025-01-17 PROCEDURE — 20000000 HC ICU ROOM

## 2025-01-17 PROCEDURE — 85730 THROMBOPLASTIN TIME PARTIAL: CPT | Performed by: EMERGENCY MEDICINE

## 2025-01-17 PROCEDURE — 94761 N-INVAS EAR/PLS OXIMETRY MLT: CPT | Mod: XB

## 2025-01-17 PROCEDURE — 82803 BLOOD GASES ANY COMBINATION: CPT

## 2025-01-17 PROCEDURE — 27000190 HC CPAP FULL FACE MASK W/VALVE

## 2025-01-17 PROCEDURE — 96374 THER/PROPH/DIAG INJ IV PUSH: CPT | Mod: 59

## 2025-01-17 PROCEDURE — 84484 ASSAY OF TROPONIN QUANT: CPT | Performed by: EMERGENCY MEDICINE

## 2025-01-17 PROCEDURE — 25000003 PHARM REV CODE 250: Performed by: INTERNAL MEDICINE

## 2025-01-17 PROCEDURE — 36415 COLL VENOUS BLD VENIPUNCTURE: CPT | Performed by: INTERNAL MEDICINE

## 2025-01-17 PROCEDURE — 83880 ASSAY OF NATRIURETIC PEPTIDE: CPT | Performed by: EMERGENCY MEDICINE

## 2025-01-17 PROCEDURE — 85730 THROMBOPLASTIN TIME PARTIAL: CPT | Mod: 91 | Performed by: INTERNAL MEDICINE

## 2025-01-17 PROCEDURE — 93010 ELECTROCARDIOGRAM REPORT: CPT | Mod: ,,, | Performed by: STUDENT IN AN ORGANIZED HEALTH CARE EDUCATION/TRAINING PROGRAM

## 2025-01-17 PROCEDURE — 80061 LIPID PANEL: CPT | Performed by: INTERNAL MEDICINE

## 2025-01-17 PROCEDURE — 80053 COMPREHEN METABOLIC PANEL: CPT | Performed by: EMERGENCY MEDICINE

## 2025-01-17 PROCEDURE — 27000221 HC OXYGEN, UP TO 24 HOURS

## 2025-01-17 PROCEDURE — 63600175 PHARM REV CODE 636 W HCPCS: Performed by: INTERNAL MEDICINE

## 2025-01-17 PROCEDURE — 86850 RBC ANTIBODY SCREEN: CPT | Performed by: INTERNAL MEDICINE

## 2025-01-17 PROCEDURE — 99285 EMERGENCY DEPT VISIT HI MDM: CPT | Mod: 25

## 2025-01-17 PROCEDURE — 83605 ASSAY OF LACTIC ACID: CPT | Performed by: EMERGENCY MEDICINE

## 2025-01-17 PROCEDURE — 25500020 PHARM REV CODE 255: Performed by: INTERNAL MEDICINE

## 2025-01-17 PROCEDURE — 83036 HEMOGLOBIN GLYCOSYLATED A1C: CPT | Performed by: INTERNAL MEDICINE

## 2025-01-17 PROCEDURE — 5A09457 ASSISTANCE WITH RESPIRATORY VENTILATION, 24-96 CONSECUTIVE HOURS, CONTINUOUS POSITIVE AIRWAY PRESSURE: ICD-10-PCS | Performed by: INTERNAL MEDICINE

## 2025-01-17 PROCEDURE — 94660 CPAP INITIATION&MGMT: CPT

## 2025-01-17 PROCEDURE — 96365 THER/PROPH/DIAG IV INF INIT: CPT

## 2025-01-17 PROCEDURE — 96375 TX/PRO/DX INJ NEW DRUG ADDON: CPT

## 2025-01-17 PROCEDURE — 83735 ASSAY OF MAGNESIUM: CPT | Performed by: EMERGENCY MEDICINE

## 2025-01-17 PROCEDURE — 85610 PROTHROMBIN TIME: CPT | Performed by: EMERGENCY MEDICINE

## 2025-01-17 PROCEDURE — 84484 ASSAY OF TROPONIN QUANT: CPT | Mod: 91 | Performed by: INTERNAL MEDICINE

## 2025-01-17 PROCEDURE — 63600175 PHARM REV CODE 636 W HCPCS: Performed by: EMERGENCY MEDICINE

## 2025-01-17 PROCEDURE — 99900035 HC TECH TIME PER 15 MIN (STAT)

## 2025-01-17 PROCEDURE — 99291 CRITICAL CARE FIRST HOUR: CPT | Mod: 25,,, | Performed by: INTERNAL MEDICINE

## 2025-01-17 PROCEDURE — 85027 COMPLETE CBC AUTOMATED: CPT | Performed by: EMERGENCY MEDICINE

## 2025-01-17 PROCEDURE — 36600 WITHDRAWAL OF ARTERIAL BLOOD: CPT

## 2025-01-17 PROCEDURE — 93005 ELECTROCARDIOGRAM TRACING: CPT

## 2025-01-17 PROCEDURE — 36415 COLL VENOUS BLD VENIPUNCTURE: CPT | Mod: XB | Performed by: INTERNAL MEDICINE

## 2025-01-17 RX ORDER — ATORVASTATIN CALCIUM 40 MG/1
80 TABLET, FILM COATED ORAL DAILY
Status: DISCONTINUED | OUTPATIENT
Start: 2025-01-18 | End: 2025-01-20

## 2025-01-17 RX ORDER — ONDANSETRON 8 MG/1
8 TABLET, ORALLY DISINTEGRATING ORAL EVERY 8 HOURS PRN
Status: DISCONTINUED | OUTPATIENT
Start: 2025-01-17 | End: 2025-01-20 | Stop reason: HOSPADM

## 2025-01-17 RX ORDER — METOPROLOL TARTRATE 25 MG/1
25 TABLET, FILM COATED ORAL 2 TIMES DAILY
Status: DISCONTINUED | OUTPATIENT
Start: 2025-01-17 | End: 2025-01-20

## 2025-01-17 RX ORDER — TALC
6 POWDER (GRAM) TOPICAL NIGHTLY PRN
Status: DISCONTINUED | OUTPATIENT
Start: 2025-01-18 | End: 2025-01-20 | Stop reason: HOSPADM

## 2025-01-17 RX ORDER — CLOPIDOGREL BISULFATE 75 MG/1
75 TABLET ORAL DAILY
Status: DISCONTINUED | OUTPATIENT
Start: 2025-01-18 | End: 2025-01-20

## 2025-01-17 RX ORDER — FUROSEMIDE 10 MG/ML
80 INJECTION INTRAMUSCULAR; INTRAVENOUS EVERY 12 HOURS
Status: DISCONTINUED | OUTPATIENT
Start: 2025-01-17 | End: 2025-01-18

## 2025-01-17 RX ORDER — FUROSEMIDE 10 MG/ML
80 INJECTION INTRAMUSCULAR; INTRAVENOUS
Status: COMPLETED | OUTPATIENT
Start: 2025-01-17 | End: 2025-01-17

## 2025-01-17 RX ORDER — POLYETHYLENE GLYCOL 3350 17 G/17G
17 POWDER, FOR SOLUTION ORAL DAILY
Status: DISCONTINUED | OUTPATIENT
Start: 2025-01-18 | End: 2025-01-20 | Stop reason: HOSPADM

## 2025-01-17 RX ORDER — NITROGLYCERIN 0.4 MG/1
0.4 TABLET SUBLINGUAL EVERY 5 MIN PRN
Status: DISCONTINUED | OUTPATIENT
Start: 2025-01-17 | End: 2025-01-20 | Stop reason: HOSPADM

## 2025-01-17 RX ORDER — HEPARIN SODIUM,PORCINE/D5W 25000/250
0-40 INTRAVENOUS SOLUTION INTRAVENOUS CONTINUOUS
Status: DISCONTINUED | OUTPATIENT
Start: 2025-01-17 | End: 2025-01-20

## 2025-01-17 RX ORDER — ACETAMINOPHEN 325 MG/1
650 TABLET ORAL EVERY 8 HOURS PRN
Status: DISCONTINUED | OUTPATIENT
Start: 2025-01-17 | End: 2025-01-20 | Stop reason: HOSPADM

## 2025-01-17 RX ORDER — NAPROXEN SODIUM 220 MG/1
324 TABLET, FILM COATED ORAL ONCE
Status: COMPLETED | OUTPATIENT
Start: 2025-01-17 | End: 2025-01-17

## 2025-01-17 RX ORDER — ASPIRIN 81 MG/1
81 TABLET ORAL DAILY
Status: DISCONTINUED | OUTPATIENT
Start: 2025-01-18 | End: 2025-01-20 | Stop reason: HOSPADM

## 2025-01-17 RX ADMIN — FUROSEMIDE 80 MG: 10 INJECTION, SOLUTION INTRAMUSCULAR; INTRAVENOUS at 04:01

## 2025-01-17 RX ADMIN — METOPROLOL TARTRATE 25 MG: 25 TABLET, FILM COATED ORAL at 09:01

## 2025-01-17 RX ADMIN — FUROSEMIDE 80 MG: 10 INJECTION, SOLUTION INTRAMUSCULAR; INTRAVENOUS at 09:01

## 2025-01-17 RX ADMIN — ASPIRIN 81 MG CHEWABLE TABLET 324 MG: 81 TABLET CHEWABLE at 07:01

## 2025-01-17 RX ADMIN — HEPARIN SODIUM 12 UNITS/KG/HR: 10000 INJECTION, SOLUTION INTRAVENOUS at 04:01

## 2025-01-17 RX ADMIN — HUMAN ALBUMIN MICROSPHERES AND PERFLUTREN 0.11 MG: 10; .22 INJECTION, SOLUTION INTRAVENOUS at 05:01

## 2025-01-17 NOTE — ED PROVIDER NOTES
Encounter Date: 1/17/2025       History     Chief Complaint   Patient presents with    Chest Pain     [facility] transfer from [facility], given 600 mg plavix and 324 mg asa pta.      Miguel Mckee is a 79 y.o. male who  has a past medical history of CHF (congestive heart failure), Coronary artery disease, Hypertension, and Thyroid disease.    The patient presents to the ED from [facility] as a transfer for STEMI.  Patient reports having chest pain a couple of days ago.  The patient was accepted by Dr. Hernandez who is at bedside.  Patient appears in respiratory distress with shortness of breath tachypnea accessory muscle use.  He has pulmonary edema.  Patient was placed on BiPAP by Dr. Perla and given 80 mg of IV Lasix.  Patient denies any pain at this time.  He received aspirin 324 and Plavix 600 mg prior to transfer.    The history is provided by the patient and medical records.     Review of patient's allergies indicates:  No Known Allergies  Past Medical History:   Diagnosis Date    CHF (congestive heart failure)     Coronary artery disease     Hypertension     Thyroid disease      Past Surgical History:   Procedure Laterality Date    APPENDECTOMY      NEPHRECTOMY Left      No family history on file.  Social History     Tobacco Use    Smoking status: Former     Types: Cigarettes   Substance Use Topics    Alcohol use: Yes     Comment: 2 cans beer/day     Review of Systems   Constitutional:  Negative for fever.   HENT:  Negative for sore throat.    Respiratory:  Positive for shortness of breath.    Cardiovascular:  Negative for chest pain.   Gastrointestinal:  Negative for nausea.   Genitourinary:  Negative for dysuria.   Musculoskeletal:  Negative for back pain.   Skin:  Negative for rash.   Neurological:  Negative for weakness.   Hematological:  Does not bruise/bleed easily.       Physical Exam     Initial Vitals   BP Pulse Resp Temp SpO2   01/17/25 1608 01/17/25 1600 01/17/25 1600 01/17/25 1602 01/17/25  1602   (!) 149/73 93 20 97.8 °F (36.6 °C) 95 %      MAP       --                Physical Exam    Nursing note and vitals reviewed.  Constitutional: He appears well-developed and well-nourished. He is not diaphoretic. No distress.   HENT:   Head: Normocephalic and atraumatic.   Eyes: Conjunctivae are normal.   Cardiovascular:  Regular rhythm and intact distal pulses.           Pulmonary/Chest: No respiratory distress. He has rales.   On BiPAP   Abdominal: He exhibits no distension.     Neurological: He is alert.   Skin: Skin is warm and dry. Capillary refill takes less than 2 seconds. No rash noted.   Psychiatric: He has a normal mood and affect.         ED Course   Procedures  Labs Reviewed   COMPREHENSIVE METABOLIC PANEL - Abnormal       Result Value    Sodium 127 (*)     Potassium 4.0      Chloride 95      CO2 23      Glucose 108      BUN 13      Creatinine 0.9      Calcium 8.8      Total Protein 6.6      Albumin 3.5      Total Bilirubin 0.7      Alkaline Phosphatase 86      AST 49 (*)     ALT 31      eGFR >60      Anion Gap 9     TROPONIN I - Abnormal    Troponin I 0.935 (*)    CBC WITHOUT DIFFERENTIAL - Abnormal    WBC 4.29      RBC 4.22 (*)     Hemoglobin 14.0      Hematocrit 39.6 (*)     MCV 94      MCH 33.2 (*)     MCHC 35.4      RDW 11.8      Platelets 157      MPV 9.9     B-TYPE NATRIURETIC PEPTIDE - Abnormal    BNP 1,110 (*)    LACTIC ACID, PLASMA    Lactate (Lactic Acid) 0.9     APTT   B-TYPE NATRIURETIC PEPTIDE   CBC W/ AUTO DIFFERENTIAL   COMPREHENSIVE METABOLIC PANEL   MAGNESIUM   PROTIME-INR   TROPONIN I   APTT   PROTIME-INR   APTT    aPTT 31.0     PROTIME-INR    Prothrombin Time 11.9      INR 1.0     MAGNESIUM    Magnesium 1.8       EKG Readings: (Independently Interpreted)   EKG at 3:59 p.m.: Rate 94.  Sinus rhythm.  ST elevations in V2 V3 with reciprocal change suspected anterior STEMI.  QTC is prolonged.   Other EKG Interpretations: EKG: Rate 99.  Sinus rhythm with ST elevations in V2 V3 with  reciprocal change.  STEMI.  Consistent with previous EKG.       Imaging Results              X-Ray Chest AP Portable (Final result)  Result time 01/17/25 16:56:44      Final result by Keyur Riggs MD (01/17/25 16:56:44)                   Impression:      1. Chronic appearing interstitial findings, superimposed edema is a consideration.      Electronically signed by: Keyur Riggs MD  Date:    01/17/2025  Time:    16:56               Narrative:    EXAMINATION:  XR CHEST AP PORTABLE    CLINICAL HISTORY:  Shortness of breath    TECHNIQUE:  Single frontal view of the chest was performed.    COMPARISON:  11/26/2019    FINDINGS:  The cardiomediastinal silhouette is not enlarged noting calcification of the aorta..  There is no pleural effusion.  Prominent epicardial fat likely accounts for attenuation along the right upper pleural space rather than pleural effusion..  The trachea is midline.  The lungs are symmetrically expanded bilaterally with coarse interstitial attenuation.  No large focal consolidation seen.  There is no pneumothorax.  The osseous structures are remarkable for degenerative change..                                       Medications   heparin 25,000 units in dextrose 5% 250 mL (100 units/mL) infusion LOW INTENSITY nomogram - [facility] (12 Units/kg/hr × 84 kg Intravenous Handoff 1/17/25 1904)   heparin 25,000 units in dextrose 5% (100 units/ml) IV bolus from bag LOW INTENSITY nomogram - [facility] (has no administration in time range)   heparin 25,000 units in dextrose 5% (100 units/ml) IV bolus from bag LOW INTENSITY nomogram - [facility] (has no administration in time range)   aspirin EC tablet 81 mg (has no administration in time range)   clopidogreL tablet 75 mg (has no administration in time range)   furosemide injection 80 mg (80 mg Intravenous Given 1/17/25 2120)   metoprolol tartrate (LOPRESSOR) tablet 25 mg (25 mg Oral Given 1/17/25 2120)   atorvastatin tablet 80 mg (has no administration in time range)    nitroGLYCERIN SL tablet 0.4 mg (has no administration in time range)   ondansetron disintegrating tablet 8 mg (has no administration in time range)   polyethylene glycol packet 17 g (has no administration in time range)   acetaminophen tablet 650 mg (has no administration in time range)   furosemide injection 80 mg (80 mg Intravenous Given 1/17/25 1605)   heparin 25,000 units in dextrose 5% (100 units/ml) IV bolus from bag LOW INTENSITY nomogram - [facility] (3,990 Units Intravenous Bolus from Bag 1/17/25 1631)   perflutren protein-A microsphr 0.22 mg/mL IV susp (0.11 mg Intravenous Given 1/17/25 1700)   aspirin chewable tablet 324 mg (324 mg Oral Given 1/17/25 1930)     Medical Decision Making  Differential Diagnosis includes, but is not limited to:  PE, MI/ACS, pneumothorax, pericardial effusion/tamonade, pneumonia, lung abscess, pericarditis/myocarditis, pleural effusion, lung mass, CHF exacerbation, asthma exacerbation, COPD exacerbation, aspirated/ingested foreign body, airway obstruction, CO poisoning, anemia, metabolic derangement, allergy/atopy, influenza, viral URI, viral syndrome.      Amount and/or Complexity of Data Reviewed  Labs: ordered. Decision-making details documented in ED Course.  Radiology: ordered. Decision-making details documented in ED Course.    Risk  Prescription drug management.  Decision regarding hospitalization.  Risk Details: Patient remained stable on BiPAP.  I discussed the case with [facility] who will assume care in coordination with cardiology.    Critical Care  Total time providing critical care: 30 minutes               ED Course as of 01/17/25 2145   Fri Jan 17, 2025   8058 Dr. Hernandez notified of patient's arrival. EKG ordered per request. RN notified to have EKG at bedside [CS]   1616 Discussed with Dr. Perla is evaluated the patient.  Patient appears to be in respiratory distress with pulmonary edema.  EKG demonstrates Q-waves thus he does not recommend emergent  PCI at this time and recommend stabilization of his breathing with BiPAP and Lasix , heparin infusion and admission to ICU by medicine and he will take the patient for catheterization once the patient is more stabilized.  Of note patient denies any chest pain states his chest pain started a couple of days ago.  On my assessment patient reports improvement of his breathing after placement on BiPAP  [RN]   1648 CBC Without Differential(!)  No significant abnormality.    [RN]   1648 POCT ARTERIAL BLOOD GAS(!)  Reassuring, FiO2 was decreased [RN]   1648 Lactic acid, plasma  No significant abnormality.    [RN]   1648 Protime-INR  No significant abnormality.      [RN]   1648 APTT  No significant abnormality.    [RN]   1648 X-Ray Chest AP Portable  No acute process by my independent interpretation [RN]      ED Course User Index  [CS] Addie Ag PA-C  [RN] Bhupinder Jimenez Jr., MD                           Clinical Impression:  Final diagnoses:  [I21.3] STEMI (ST elevation myocardial infarction)  [R06.02] Shortness of breath          ED Disposition Condition    Admit Stable               Portions of this note were dictated using voice recognition software and may contain dictation related errors in spelling/grammar/syntax not found on text review       Bhupinder Jimenez Jr., MD  01/17/25 6174

## 2025-01-17 NOTE — Clinical Note
Diagnosis: STEMI (ST elevation myocardial infarction) [978170]   Future Attending Provider: GINETTE GILBERT [[id number]]   Reason for IP Medical Treatment  (Clinical interventions that can only be accomplished in the IP setting? ) :: STEMI heparin ggt   Plans for Post-Acute care--if anticipated (pick the single best option):: A. No post acute care anticipated at this time

## 2025-01-17 NOTE — Clinical Note
100 ml of contrast were injected throughout the case. 200 mL of contrast was the total wasted during the case. 300 mL was the total amount used during the case.

## 2025-01-17 NOTE — CONSULTS
[facility] - Emergency Dept  Cardiology  Consult Note    Patient Name: Miguel Mckee  MRN: [id number]  Admission Date: 1/17/2025  Hospital Length of Stay: 0 days  Code Status: Prior   Attending Provider: Bhupinder Jimenez Jr., MD   Consulting Provider: Jorge Hernandez MD  Primary Care Physician: [facility] -  Principal Problem:<principal problem not specified>    Patient information was obtained from patient, past medical records, and ER records.     Consults  Subjective:     Chief Complaint:  Shortness of breath    HPI:  Transferred from [facility] for STEMI.  79 years old with no prior cardiac history who had chest pain constant 2 days ago.  Today started having significant shortness of breath so went to [facility].  EKG at [facility] showed ST elevation in V 1/V2 with associated Q-waves.  The patient denies any chest pain currently just shortness of breath.  Patient has severe respiratory distress with crackles and elevated JVD.  He was loaded with Plavix and aspirin.  Started on BiPAP now and was given 80 IV Lasix.  Bedside echo with akinesia across the anterior wall.  He is chest pain-free now.  Breathing is better with BiPAP    Past Medical History:   Diagnosis Date    CHF (congestive heart failure)     Coronary artery disease     Hypertension     Thyroid disease        Past Surgical History:   Procedure Laterality Date    APPENDECTOMY      NEPHRECTOMY Left        Review of patient's allergies indicates:  No Known Allergies    No current facility-administered medications on file prior to encounter.     No current outpatient medications on file prior to encounter.     Family History    None       Tobacco Use    Smoking status: Former     Types: Cigarettes    Smokeless tobacco: Not on file   Substance and Sexual Activity    Alcohol use: Yes     Comment: 2 cans beer/day    Drug use: Not on file    Sexual activity: Not on file     Review of Systems   Unable to perform ROS: Acuity of  "condition     Objective:     Vital Signs (Most Recent):  Temp: 98.9 °F (37.2 °C) (01/17/25 1607)  Pulse: 93 (01/17/25 1600)  Resp: 20 (01/17/25 1600)  BP: (!) 149/73 (01/17/25 1608)  SpO2: 95 % (01/17/25 1602) Vital Signs (24h Range):  Temp:  [97.7 °F (36.5 °C)-98.9 °F (37.2 °C)] 98.9 °F (37.2 °C)  Pulse:  [93-96] 93  Resp:  [20-24] 20  SpO2:  [95 %] 95 %  BP: (149-170)/(73-74) 149/73     Weight: 84 kg (185 lb 3 oz)  Body mass index is 25.12 kg/m².    SpO2: 95 %       No intake or output data in the 24 hours ending 01/17/25 1620    Lines/Drains/Airways       Peripheral Intravenous Line  Duration                  Peripheral IV - Single Lumen 01/17/25 1556 20 G Right Antecubital <1 day         Peripheral IV - Single Lumen 01/17/25 1603 20 G Left Forearm <1 day                    Physical Exam  Constitutional:       General: He is in acute distress.   HENT:      Head: Normocephalic and atraumatic.   Cardiovascular:      Rate and Rhythm: Normal rate and regular rhythm.      Heart sounds: Murmur heard.   Pulmonary:      Effort: Respiratory distress present.      Breath sounds: Wheezing, rhonchi and rales present.      Comments: Using abdominal muscles for breathing  Abdominal:      Comments: Using abdominal muscles for breathing   Neurological:      Mental Status: He is oriented to person, place, and time.         Significant Labs: ABG: No results for input(s): "PH", "PCO2", "HCO3", "POCSATURATED", "BE" in the last 48 hours., BMP: No results for input(s): "GLU", "NA", "K", "CL", "CO2", "BUN", "CREATININE", "CALCIUM", "MG" in the last 48 hours., CMP No results for input(s): "NA", "K", "CL", "CO2", "GLU", "BUN", "CREATININE", "CALCIUM", "PROT", "ALBUMIN", "BILITOT", "ALKPHOS", "AST", "ALT", "ANIONGAP", "ESTGFRAFRICA", "EGFRNONAA" in the last 48 hours., CBC   Recent Labs   Lab 01/17/25  1618   WBC 4.29   HGB 14.0   HCT 39.6*      , Lipid Panel No results for input(s): "CHOL", "HDL", "LDLCALC", "TRIG", "CHOLHDL" in " "the last 48 hours., and Troponin No results for input(s): "TROPONINIHS" in the last 48 hours.    Significant Imaging: Echocardiogram: 2D echo with color flow doppler: No results found for this or any previous visit. and Transthoracic echo (TTE) complete (Cupid Only): No results found for this or any previous visit.  Assessment and Plan:   79 years old male with    1. Late presentation STEMI  2. Acute decompensated heart failure/new onset  3. Hyponatremia  4. Respiratory distress    Plan  EKG reviewed with evidence of Q-waves already across the anterior leads.  He is chest pain-free now however reports significant shortness of breath.  Chest pain was 2 days ago.  At the present time given his late presentation and severe respiratory distress we will hold on taking to the cath lab till stabilization of his respiratory status.  He is at significant risk of decompensation if taken to the cath lab at this time and he denies any chest pain now just the shortness of breath.  Chest pain was 2 days ago    IV Lasix 80 mg given in the ER  Stat repeat chest x-ray and labs ordered  Start heparin drip ACS protocol  Continue aspirin and Plavix  Stat echocardiogram  Repeat troponins and follow trend      Critical care time was 45 minutes. Interviewing the patient, reviewing chart, counseling and coordinating care   There are no hospital problems to display for this patient.      VTE Risk Mitigation (From admission, onward)           Ordered     heparin 25,000 units in dextrose 5% (100 units/ml) IV bolus from bag LOW INTENSITY nomogram - [facility]  As needed (PRN)        Question:  Heparin Infusion Adjustment (DO NOT MODIFY ANSWER)  Answer:  [email or web address]\Images\Pharmacy\HeparinInfusions\heparin LOW INTENSITY nomogram for [facility] [id number]    01/17/25 1613     heparin 25,000 units in dextrose 5% (100 units/ml) IV bolus from bag LOW INTENSITY nomogram - [facility]  As needed (PRN)        Question:  Heparin Infusion Adjustment (DO NOT MODIFY " ANSWER)  Answer:  [email or web address]\[email or web address]\Images\Pharmacy\HeparinInfusions\heparin LOW INTENSITY nomogram for [facility] [id number]    01/17/25 1613     heparin 25,000 units in dextrose 5% (100 units/ml) IV bolus from bag LOW INTENSITY nomogram - [facility]  Once        Question:  Heparin Infusion Adjustment (DO NOT MODIFY ANSWER)  Answer:  [email or web address]\Images\Pharmacy\HeparinInfusions\heparin LOW INTENSITY nomogram for [facility] [id number]    01/17/25 1613     heparin 25,000 units in dextrose 5% 250 mL (100 units/mL) infusion LOW INTENSITY nomogram - [facility]  Continuous        Question:  Begin at (units/kg/hr)  Answer:  12    01/17/25 1613                      Critical care time was 45 minutes. Interviewing the patient, reviewing chart, counseling and coordinating care     Thank you for your consult. I will follow-up with patient. Please contact us if you have any additional questions.    Jorge Hernandez MD  Cardiology   [place] - Emergency Dept

## 2025-01-17 NOTE — ED TRIAGE NOTES
Pt arrives from [facility] as STEMI transfer, given 600 plavix and 324 asa PTA. Pt complains of shortness of breath on arrival, rates CP 7/10.

## 2025-01-18 PROBLEM — I50.9 ACUTE DECOMPENSATED HEART FAILURE: Status: ACTIVE | Noted: 2025-01-18

## 2025-01-18 PROBLEM — I21.4 NSTEMI (NON-ST ELEVATION MYOCARDIAL INFARCTION): Status: ACTIVE | Noted: 2025-01-17

## 2025-01-18 PROBLEM — I24.9 ACUTE CORONARY SYNDROME: Status: ACTIVE | Noted: 2025-01-18

## 2025-01-18 PROBLEM — R06.02 SHORTNESS OF BREATH: Status: ACTIVE | Noted: 2025-01-18

## 2025-01-18 LAB
ANION GAP SERPL CALC-SCNC: 13 MMOL/L (ref 8–16)
ANION GAP SERPL CALC-SCNC: 14 MMOL/L (ref 8–16)
APTT PPP: 52.6 SEC (ref 21–32)
APTT PPP: 55.4 SEC (ref 21–32)
BASOPHILS # BLD AUTO: 0.01 K/UL (ref 0–0.2)
BASOPHILS NFR BLD: 0.2 % (ref 0–1.9)
BUN SERPL-MCNC: 15 MG/DL (ref 8–23)
BUN SERPL-MCNC: 18 MG/DL (ref 8–23)
CALCIUM SERPL-MCNC: 8.6 MG/DL (ref 8.7–10.5)
CALCIUM SERPL-MCNC: 8.6 MG/DL (ref 8.7–10.5)
CHLORIDE SERPL-SCNC: 93 MMOL/L (ref 95–110)
CHLORIDE SERPL-SCNC: 94 MMOL/L (ref 95–110)
CO2 SERPL-SCNC: 21 MMOL/L (ref 23–29)
CO2 SERPL-SCNC: 21 MMOL/L (ref 23–29)
CREAT SERPL-MCNC: 0.9 MG/DL (ref 0.5–1.4)
CREAT SERPL-MCNC: 1 MG/DL (ref 0.5–1.4)
DIFFERENTIAL METHOD BLD: ABNORMAL
EOSINOPHIL # BLD AUTO: 0 K/UL (ref 0–0.5)
EOSINOPHIL NFR BLD: 0 % (ref 0–8)
ERYTHROCYTE [DISTWIDTH] IN BLOOD BY AUTOMATED COUNT: 11.8 % (ref 11.5–14.5)
EST. GFR  (NO RACE VARIABLE): >60 ML/MIN/1.73 M^2
EST. GFR  (NO RACE VARIABLE): >60 ML/MIN/1.73 M^2
GLUCOSE SERPL-MCNC: 114 MG/DL (ref 70–110)
GLUCOSE SERPL-MCNC: 146 MG/DL (ref 70–110)
HCT VFR BLD AUTO: 37.4 % (ref 40–54)
HGB BLD-MCNC: 13.1 G/DL (ref 14–18)
IMM GRANULOCYTES # BLD AUTO: 0.02 K/UL (ref 0–0.04)
IMM GRANULOCYTES NFR BLD AUTO: 0.3 % (ref 0–0.5)
LYMPHOCYTES # BLD AUTO: 0.5 K/UL (ref 1–4.8)
LYMPHOCYTES NFR BLD: 8.7 % (ref 18–48)
MCH RBC QN AUTO: 32.3 PG (ref 27–31)
MCHC RBC AUTO-ENTMCNC: 35 G/DL (ref 32–36)
MCV RBC AUTO: 92 FL (ref 82–98)
MONOCYTES # BLD AUTO: 0.6 K/UL (ref 0.3–1)
MONOCYTES NFR BLD: 8.8 % (ref 4–15)
NEUTROPHILS # BLD AUTO: 5.1 K/UL (ref 1.8–7.7)
NEUTROPHILS NFR BLD: 82 % (ref 38–73)
NRBC BLD-RTO: 0 /100 WBC
PLATELET # BLD AUTO: 168 K/UL (ref 150–450)
PMV BLD AUTO: 10 FL (ref 9.2–12.9)
POTASSIUM SERPL-SCNC: 3.3 MMOL/L (ref 3.5–5.1)
POTASSIUM SERPL-SCNC: 3.8 MMOL/L (ref 3.5–5.1)
RBC # BLD AUTO: 4.05 M/UL (ref 4.6–6.2)
SODIUM SERPL-SCNC: 128 MMOL/L (ref 136–145)
SODIUM SERPL-SCNC: 128 MMOL/L (ref 136–145)
TROPONIN I SERPL DL<=0.01 NG/ML-MCNC: 0.69 NG/ML (ref 0–0.03)
WBC # BLD AUTO: 6.23 K/UL (ref 3.9–12.7)

## 2025-01-18 PROCEDURE — 93010 ELECTROCARDIOGRAM REPORT: CPT | Mod: ,,, | Performed by: STUDENT IN AN ORGANIZED HEALTH CARE EDUCATION/TRAINING PROGRAM

## 2025-01-18 PROCEDURE — 25000003 PHARM REV CODE 250: Performed by: FAMILY MEDICINE

## 2025-01-18 PROCEDURE — 63600175 PHARM REV CODE 636 W HCPCS: Performed by: EMERGENCY MEDICINE

## 2025-01-18 PROCEDURE — 63600175 PHARM REV CODE 636 W HCPCS: Performed by: INTERNAL MEDICINE

## 2025-01-18 PROCEDURE — 27000221 HC OXYGEN, UP TO 24 HOURS

## 2025-01-18 PROCEDURE — 99900035 HC TECH TIME PER 15 MIN (STAT)

## 2025-01-18 PROCEDURE — 94761 N-INVAS EAR/PLS OXIMETRY MLT: CPT

## 2025-01-18 PROCEDURE — 85730 THROMBOPLASTIN TIME PARTIAL: CPT | Mod: 91 | Performed by: INTERNAL MEDICINE

## 2025-01-18 PROCEDURE — 80048 BASIC METABOLIC PNL TOTAL CA: CPT | Performed by: FAMILY MEDICINE

## 2025-01-18 PROCEDURE — 63600175 PHARM REV CODE 636 W HCPCS: Performed by: STUDENT IN AN ORGANIZED HEALTH CARE EDUCATION/TRAINING PROGRAM

## 2025-01-18 PROCEDURE — 36415 COLL VENOUS BLD VENIPUNCTURE: CPT | Performed by: STUDENT IN AN ORGANIZED HEALTH CARE EDUCATION/TRAINING PROGRAM

## 2025-01-18 PROCEDURE — 93005 ELECTROCARDIOGRAM TRACING: CPT

## 2025-01-18 PROCEDURE — 25000003 PHARM REV CODE 250: Performed by: INTERNAL MEDICINE

## 2025-01-18 PROCEDURE — 80048 BASIC METABOLIC PNL TOTAL CA: CPT | Mod: 91 | Performed by: STUDENT IN AN ORGANIZED HEALTH CARE EDUCATION/TRAINING PROGRAM

## 2025-01-18 PROCEDURE — 84484 ASSAY OF TROPONIN QUANT: CPT | Performed by: INTERNAL MEDICINE

## 2025-01-18 PROCEDURE — 25000003 PHARM REV CODE 250: Performed by: STUDENT IN AN ORGANIZED HEALTH CARE EDUCATION/TRAINING PROGRAM

## 2025-01-18 PROCEDURE — 85025 COMPLETE CBC W/AUTO DIFF WBC: CPT | Performed by: EMERGENCY MEDICINE

## 2025-01-18 PROCEDURE — 21400001 HC TELEMETRY ROOM

## 2025-01-18 PROCEDURE — 99291 CRITICAL CARE FIRST HOUR: CPT | Mod: ,,, | Performed by: INTERNAL MEDICINE

## 2025-01-18 RX ORDER — POTASSIUM CHLORIDE 20 MEQ/1
40 TABLET, EXTENDED RELEASE ORAL ONCE
Status: COMPLETED | OUTPATIENT
Start: 2025-01-18 | End: 2025-01-18

## 2025-01-18 RX ORDER — FUROSEMIDE 10 MG/ML
40 INJECTION INTRAMUSCULAR; INTRAVENOUS EVERY 12 HOURS
Status: DISCONTINUED | OUTPATIENT
Start: 2025-01-18 | End: 2025-01-19

## 2025-01-18 RX ORDER — MAGNESIUM SULFATE HEPTAHYDRATE 40 MG/ML
2 INJECTION, SOLUTION INTRAVENOUS ONCE
Status: COMPLETED | OUTPATIENT
Start: 2025-01-18 | End: 2025-01-18

## 2025-01-18 RX ORDER — MUPIROCIN 20 MG/G
OINTMENT TOPICAL 2 TIMES DAILY
Status: DISCONTINUED | OUTPATIENT
Start: 2025-01-18 | End: 2025-01-20 | Stop reason: HOSPADM

## 2025-01-18 RX ADMIN — MUPIROCIN: 20 OINTMENT TOPICAL at 09:01

## 2025-01-18 RX ADMIN — FUROSEMIDE 40 MG: 10 INJECTION, SOLUTION INTRAMUSCULAR; INTRAVENOUS at 09:01

## 2025-01-18 RX ADMIN — METOPROLOL TARTRATE 25 MG: 25 TABLET, FILM COATED ORAL at 08:01

## 2025-01-18 RX ADMIN — CLOPIDOGREL BISULFATE 75 MG: 75 TABLET ORAL at 08:01

## 2025-01-18 RX ADMIN — POTASSIUM CHLORIDE 40 MEQ: 1500 TABLET, EXTENDED RELEASE ORAL at 09:01

## 2025-01-18 RX ADMIN — ASPIRIN 81 MG: 81 TABLET, COATED ORAL at 08:01

## 2025-01-18 RX ADMIN — POLYETHYLENE GLYCOL (3350) 17 G: 17 POWDER, FOR SOLUTION ORAL at 08:01

## 2025-01-18 RX ADMIN — POTASSIUM CHLORIDE 40 MEQ: 1500 TABLET, EXTENDED RELEASE ORAL at 05:01

## 2025-01-18 RX ADMIN — METOPROLOL TARTRATE 25 MG: 25 TABLET, FILM COATED ORAL at 09:01

## 2025-01-18 RX ADMIN — ATORVASTATIN CALCIUM 80 MG: 40 TABLET, FILM COATED ORAL at 08:01

## 2025-01-18 RX ADMIN — MELATONIN TAB 3 MG 6 MG: 3 TAB at 01:01

## 2025-01-18 RX ADMIN — MAGNESIUM SULFATE HEPTAHYDRATE 2 G: 40 INJECTION, SOLUTION INTRAVENOUS at 09:01

## 2025-01-18 RX ADMIN — HEPARIN SODIUM 10 UNITS/KG/HR: 10000 INJECTION, SOLUTION INTRAVENOUS at 03:01

## 2025-01-18 RX ADMIN — MUPIROCIN: 20 OINTMENT TOPICAL at 08:01

## 2025-01-18 RX ADMIN — FUROSEMIDE 80 MG: 10 INJECTION, SOLUTION INTRAMUSCULAR; INTRAVENOUS at 08:01

## 2025-01-18 NOTE — CONSULTS
Food & Nutrition  Education    Diet Education: Cardiac Diet Education  Time Spent: -  Learners: -      Nutrition Education provided with handouts (attached to d/c paperwork): Heart Healthy Diet, Diet and Health      Comments: Consult received for heart healthy diet education. Unable to educate pt at this time, RD providing remote coverage. RD to follow-up at a late date to educate pt in person. Educational handouts attached to discharge paperwork.       Please Re-consult as needed        Thanks!

## 2025-01-18 NOTE — SUBJECTIVE & OBJECTIVE
Interval History:     MARK  Denies cp/sob  No f/c/n/v  No complaints at this time    Review of Systems  Objective:     Vital Signs (Most Recent):  Temp: 97.9 °F (36.6 °C) (01/18/25 1051)  Pulse: 78 (01/18/25 1051)  Resp: 18 (01/18/25 1051)  BP: (!) 97/52 (01/18/25 1051)  SpO2: (!) 94 % (01/18/25 1051) Vital Signs (24h Range):  Temp:  [97.8 °F (36.6 °C)-99.2 °F (37.3 °C)] 97.9 °F (36.6 °C)  Pulse:  [] 78  Resp:  [18-76] 18  SpO2:  [93 %-100 %] 94 %  BP: ()/(52-84) 97/52     Weight: 80 kg (176 lb 5.9 oz)  Body mass index is 23.92 kg/m².    Intake/Output Summary (Last 24 hours) at 1/18/2025 1546  Last data filed at 1/18/2025 1507  Gross per 24 hour   Intake 493.57 ml   Output 3485 ml   Net -2991.43 ml         Physical Exam  Constitutional:       Appearance: He is not ill-appearing.   HENT:      Ears:      Comments: Hard of hearing     Mouth/Throat:      Mouth: Mucous membranes are moist.      Pharynx: Oropharynx is clear.   Eyes:      Extraocular Movements: Extraocular movements intact.      Conjunctiva/sclera: Conjunctivae normal.   Cardiovascular:      Rate and Rhythm: Normal rate and regular rhythm.   Pulmonary:      Effort: Pulmonary effort is normal. No respiratory distress.      Breath sounds: Normal breath sounds.   Abdominal:      General: There is no distension.      Tenderness: There is no abdominal tenderness. There is no guarding.   Musculoskeletal:         General: Normal range of motion.      Right lower leg: No edema.      Left lower leg: No edema.   Skin:     General: Skin is warm and dry.   Neurological:      General: No focal deficit present.      Mental Status: He is alert and oriented to person, place, and time.   Psychiatric:         Mood and Affect: Mood normal.         Behavior: Behavior normal.             Significant Labs: All pertinent labs within the past 24 hours have been reviewed.    Significant Imaging: I have reviewed all pertinent imaging results/findings within the past 24  hours.

## 2025-01-18 NOTE — H&P
[facility] Medicine  History & Physical    Patient Name: Miguel Mckee  MRN: [id number]  Patient Class: IP- Inpatient  Admission Date: 1/17/2025  Attending Physician: Usman Russell MD   Primary Care Provider: Carmela [facility] Michelle -         Patient information was obtained from patient and ER records.     Subjective:     Principal Problem:STEMI (ST elevation myocardial infarction)    Chief Complaint:   Chief Complaint   Patient presents with    Chest Pain     Stemi transfer from [facility], given 600 mg plavix and 324 mg asa pta.         HPI: Patient very pleasant 78 y/o M with hx HTN, no CAD, or lung disease prior, presents in [facility], due to resp distress , [facility]. Patient endorsed chest pain substernal , associated with progressive acute dyspnea, orthopnea and increased work of breathing . Responded to supplemental oxygen, BIPAP and Lasix     Past Medical History:   Diagnosis Date    CHF (congestive heart failure)     Coronary artery disease     Hypertension     Thyroid disease        Past Surgical History:   Procedure Laterality Date    APPENDECTOMY      NEPHRECTOMY Left        Review of patient's allergies indicates:  No Known Allergies    No current facility-administered medications on file prior to encounter.     No current outpatient medications on file prior to encounter.     Family History    None       Tobacco Use    Smoking status: Former     Types: Cigarettes    Smokeless tobacco: Not on file   Substance and Sexual Activity    Alcohol use: Yes     Comment: 2 cans beer/day    Drug use: Not on file    Sexual activity: Not on file     Review of Systems   Constitutional:  Positive for activity change and appetite change.   HENT: Negative.     Eyes: Negative.    Respiratory:  Positive for cough and shortness of breath.    Cardiovascular:  Positive for palpitations.   Gastrointestinal: Negative.    Endocrine: Negative.   "  Genitourinary: Negative.    Musculoskeletal: Negative.    Neurological: Negative.    Hematological: Negative.      Objective:     Vital Signs (Most Recent):  Temp: 98.6 °F (37 °C) (01/17/25 1915)  Pulse: 90 (01/17/25 2215)  Resp: (!) 44 (01/17/25 2215)  BP: (!) 109/59 (01/17/25 2200)  SpO2: 96 % (01/17/25 2215) Vital Signs (24h Range):  Temp:  [97.7 °F (36.5 °C)-98.9 °F (37.2 °C)] 98.6 °F (37 °C)  Pulse:  [] 90  Resp:  [20-57] 44  SpO2:  [95 %-100 %] 96 %  BP: (108-170)/(59-84) 109/59     Weight: 81.5 kg (179 lb 10.8 oz)  Body mass index is 24.37 kg/m².     Physical Exam           Significant Labs: All pertinent labs within the past 24 hours have been reviewed.  A1C:   Recent Labs   Lab 01/17/25  1841   HGBA1C 5.2     ABGs:   Recent Labs   Lab 01/17/25  1635   PH 7.417   PCO2 35.9   HCO3 23.1*   POCSATURATED >100.0*   PO2 164*     Bilirubin:   Recent Labs   Lab 01/17/25  1618   BILITOT 0.7     Blood Culture: No results for input(s): "LABBLOO" in the last 48 hours.  BMP:   Recent Labs   Lab 01/17/25  1618      *   K 4.0   CL 95   CO2 23   BUN 13   CREATININE 0.9   CALCIUM 8.8   MG 1.8     CBC:   Recent Labs   Lab 01/17/25  1618   WBC 4.29   HGB 14.0   HCT 39.6*        CMP:   Recent Labs   Lab 01/17/25  1618   *   K 4.0   CL 95   CO2 23      BUN 13   CREATININE 0.9   CALCIUM 8.8   PROT 6.6   ALBUMIN 3.5   BILITOT 0.7   ALKPHOS 86   AST 49*   ALT 31   ANIONGAP 9     Cardiac Markers:   Recent Labs   Lab 01/17/25  1618   BNP 1,110*     Coagulation:   Recent Labs   Lab 01/17/25  1618   INR 1.0   APTT 31.0     Lactic Acid:   Recent Labs   Lab 01/17/25  1620   LACTATE 0.9     Lipase: No results for input(s): "LIPASE" in the last 48 hours.  Lipid Panel:   Recent Labs   Lab 01/17/25  1841   CHOL 174   HDL 58   LDLCALC 104.0   TRIG 60   CHOLHDL 33.3     Magnesium:   Recent Labs   Lab 01/17/25  1618   MG 1.8       Significant Imaging: I have reviewed all pertinent imaging " results/findings within the past 24 hours.  I have reviewed and interpreted all pertinent imaging results/findings within the past 24 hours.  Assessment/Plan:     * STEMI (ST elevation myocardial infarction)    IP admit meeting benchmarks , expected length of stay more than 2 midnights   GDMT as tolerated for HR, BP , Heparin wbp  DAPT   Echo , Cardiology consult, cardiac monitor, NPO after midnight     Hyponatremia  Hyponatremia is likely due to Heart Failure. The patient's most recent sodium results are listed below.  Recent Labs     01/17/25  1618   *     Plan  - Correct the sodium by 4-6mEq in 24 hours.   - Obtain the following studies: TSH, T4.  - Will treat the hyponatremia with Fluid restriction of:  1.5 liter per day, Removal of offending medications, and Lasix 80 mg IV BID   - Monitor sodium Daily.   - Patient hyponatremia is improving      Acute hypoxic respiratory failure  Patient with Hypoxic Respiratory failure which is Acute.  he is not on home oxygen. Supplemental oxygen was provided and noted- Oxygen Concentration (%):  [28] 28    .   Signs/symptoms of respiratory failure include- respiratory distress. Contributing diagnoses includes - CHF Labs and images were reviewed. Patient Has not had a recent ABG. Will treat underlying causes and adjust management of respiratory failure as follows- As above        HFeEF POA   VTE Risk Mitigation (From admission, onward)           Ordered     IP VTE HIGH RISK PATIENT  Once         01/17/25 1817     Place sequential compression device  Until discontinued         01/17/25 1817     heparin 25,000 units in dextrose 5% (100 units/ml) IV bolus from bag LOW INTENSITY nomogram - [facility]  As needed (PRN)        Question:  Heparin Infusion Adjustment (DO NOT MODIFY ANSWER)  Answer:  [email or web address]\epic\Images\Pharmacy\HeparinInfusions\heparin LOW INTENSITY nomogram for [facility] [id number].pdf    01/17/25 1613     heparin 25,000 units in dextrose 5% (100 units/ml) IV bolus from  bag LOW INTENSITY nomogram - [facility]  As needed (PRN)        Question:  Heparin Infusion Adjustment (DO NOT MODIFY ANSWER)  Answer:  [email or web address]\epic\Images\Pharmacy\HeparinInfusions\heparin LOW INTENSITY nomogram for [facility] [id number].pdf    01/17/25 1613     heparin 25,000 units in dextrose 5% 250 mL (100 units/mL) infusion LOW INTENSITY nomogram - [facility]  Continuous        Question:  Begin at (units/kg/hr)  Answer:  12    01/17/25 1613                  Critical care time spent on the evaluation and treatment of severe organ dysfunction, review of pertinent labs and imaging studies, discussions with consulting providers and discussions with patient/family: 49  minutes.                  Usman Russell MD  Department of Hospital Medicine  [facility] - Intensive Care

## 2025-01-18 NOTE — ASSESSMENT & PLAN NOTE
Patient's blood pressure range in the last 24 hours was: BP  Min: 97/52  Max: 149/72.The patient's inpatient anti-hypertensive regimen is listed below:  Current Antihypertensives  metoprolol tartrate (LOPRESSOR) tablet 25 mg, 2 times daily, Oral  nitroGLYCERIN SL tablet 0.4 mg, Every 5 min PRN, Sublingual  furosemide injection 40 mg, Every 12 hours, Intravenous    Plan  - BP is controlled, no changes needed to their regimen  -

## 2025-01-18 NOTE — PLAN OF CARE
Patient admitted to ICU on BiPAP and concern for NSTEMI. Was diuresed effectively and improved respiratory status. Patient reports he has all ADLs at baseline and still works. Feeling almost back to normal.     Continue diuresis. Ordered another dose of potassium and recheck for 2pm. Also ordered Mg replacement. OK for step down. Cards following and planning for cath at a later date.    Isabella Mario MD  [facility] Attending  Cell: [phone]  01/18/2025 10:40 AM      > 55 minutes was spent in discussion with house staff, review of labs and imaging, discussion with primary team, and discussion with patient/family.

## 2025-01-18 NOTE — ASSESSMENT & PLAN NOTE
Hyponatremia is likely due to Heart Failure. The patient's most recent sodium results are listed below.  Recent Labs     01/17/25  1618 01/18/25  0443 01/18/25  1119   * 128* 128*       Plan  - Correct the sodium by 4-6mEq in 24 hours.   - Obtain the following studies: TSH, T4.  - Will treat the hyponatremia with Fluid restriction of:  1.5 liter per day, Removal of offending medications, and Lasix 80 mg IV BID   - Monitor sodium Daily.   - Patient hyponatremia is improving

## 2025-01-18 NOTE — SUBJECTIVE & OBJECTIVE
Past Medical History:   Diagnosis Date    CHF (congestive heart failure)     Coronary artery disease     Hypertension     Thyroid disease        Past Surgical History:   Procedure Laterality Date    APPENDECTOMY      NEPHRECTOMY Left        Review of patient's allergies indicates:  No Known Allergies    No current facility-administered medications on file prior to encounter.     No current outpatient medications on file prior to encounter.     Family History    None       Tobacco Use    Smoking status: Former     Types: Cigarettes    Smokeless tobacco: Not on file   Substance and Sexual Activity    Alcohol use: Yes     Comment: 2 cans beer/day    Drug use: Not on file    Sexual activity: Not on file     Review of Systems   Constitutional:  Positive for activity change and appetite change.   HENT: Negative.     Eyes: Negative.    Respiratory:  Positive for cough and shortness of breath.    Cardiovascular:  Positive for palpitations.   Gastrointestinal: Negative.    Endocrine: Negative.    Genitourinary: Negative.    Musculoskeletal: Negative.    Neurological: Negative.    Hematological: Negative.      Objective:     Vital Signs (Most Recent):  Temp: 98.6 °F (37 °C) (01/17/25 1915)  Pulse: 90 (01/17/25 2215)  Resp: (!) 44 (01/17/25 2215)  BP: (!) 109/59 (01/17/25 2200)  SpO2: 96 % (01/17/25 2215) Vital Signs (24h Range):  Temp:  [97.7 °F (36.5 °C)-98.9 °F (37.2 °C)] 98.6 °F (37 °C)  Pulse:  [] 90  Resp:  [20-57] 44  SpO2:  [95 %-100 %] 96 %  BP: (108-170)/(59-84) 109/59     Weight: 81.5 kg (179 lb 10.8 oz)  Body mass index is 24.37 kg/m².     Physical Exam           Significant Labs: All pertinent labs within the past 24 hours have been reviewed.  A1C:   Recent Labs   Lab 01/17/25  1841   HGBA1C 5.2     ABGs:   Recent Labs   Lab 01/17/25  1635   PH 7.417   PCO2 35.9   HCO3 23.1*   POCSATURATED >100.0*   PO2 164*     Bilirubin:   Recent Labs   Lab 01/17/25  1618   BILITOT 0.7     Blood Culture: No results for  "input(s): "LABBLOO" in the last 48 hours.  BMP:   Recent Labs   Lab 01/17/25  1618      *   K 4.0   CL 95   CO2 23   BUN 13   CREATININE 0.9   CALCIUM 8.8   MG 1.8     CBC:   Recent Labs   Lab 01/17/25  1618   WBC 4.29   HGB 14.0   HCT 39.6*        CMP:   Recent Labs   Lab 01/17/25  1618   *   K 4.0   CL 95   CO2 23      BUN 13   CREATININE 0.9   CALCIUM 8.8   PROT 6.6   ALBUMIN 3.5   BILITOT 0.7   ALKPHOS 86   AST 49*   ALT 31   ANIONGAP 9     Cardiac Markers:   Recent Labs   Lab 01/17/25  1618   BNP 1,110*     Coagulation:   Recent Labs   Lab 01/17/25  1618   INR 1.0   APTT 31.0     Lactic Acid:   Recent Labs   Lab 01/17/25  1620   LACTATE 0.9     Lipase: No results for input(s): "LIPASE" in the last 48 hours.  Lipid Panel:   Recent Labs   Lab 01/17/25  1841   CHOL 174   HDL 58   LDLCALC 104.0   TRIG 60   CHOLHDL 33.3     Magnesium:   Recent Labs   Lab 01/17/25  1618   MG 1.8       Significant Imaging: I have reviewed all pertinent imaging results/findings within the past 24 hours.  I have reviewed and interpreted all pertinent imaging results/findings within the past 24 hours.  "

## 2025-01-18 NOTE — PLAN OF CARE
Problem: Adult Inpatient Plan of Care  Goal: Plan of Care Review  Outcome: Progressing     Problem: Acute Coronary Syndrome  Goal: Optimal Adaptation to Illness  Outcome: Progressing     Problem: Noninvasive Ventilation Acute  Goal: Effective Unassisted Ventilation and Oxygenation  Outcome: Progressing     Problem: Skin Injury Risk Increased  Goal: Skin Health and Integrity  Outcome: Progressing     Problem: Cardiac Catheterization (Diagnostic/Interventional)  Goal: Absence of Bleeding  Outcome: Progressing

## 2025-01-18 NOTE — ASSESSMENT & PLAN NOTE
Patient with Hypoxic Respiratory failure which is Acute.  he is not on home oxygen. Supplemental oxygen was provided and noted- Oxygen Concentration (%):  [28] 28    .   Signs/symptoms of respiratory failure include- respiratory distress. Contributing diagnoses includes - CHF Labs and images were reviewed. Patient Has not had a recent ABG. Will treat underlying causes and adjust management of respiratory failure as follows- As above

## 2025-01-18 NOTE — PROGRESS NOTES
[facility] Medicine  Progress Note    Patient Name: Miguel Mckee  MRN: [id number]  Patient Class: IP- Inpatient   Admission Date: 1/17/2025  Length of Stay: 1 days  Attending Physician: Quinton Vargas MD  Primary Care Provider: Carmela [facility] Michelle -        Subjective     Principal Problem:NSTEMI (non-ST elevation myocardial infarction)  Acute Condition: stable        HPI:  Patient very pleasant 78 y/o M with hx HTN, no CAD, or lung disease prior, presents in [facility], due to resp distress , transferred from [facility] hospital to here for further eval. Patient endorsed chest pain substernal , associated with progressive acute dyspnea, orthopnea and increased work of breathing . Responded to supplemental oxygen, BIPAP and Lasix     Overview/Hospital Course:  No notes on file    Interval History:     DAVINEON  Denies cp/sob  No f/c/n/v  No complaints at this time    Review of Systems  Objective:     Vital Signs (Most Recent):  Temp: 97.9 °F (36.6 °C) (01/18/25 1051)  Pulse: 78 (01/18/25 1051)  Resp: 18 (01/18/25 1051)  BP: (!) 97/52 (01/18/25 1051)  SpO2: (!) 94 % (01/18/25 1051) Vital Signs (24h Range):  Temp:  [97.8 °F (36.6 °C)-99.2 °F (37.3 °C)] 97.9 °F (36.6 °C)  Pulse:  [] 78  Resp:  [18-76] 18  SpO2:  [93 %-100 %] 94 %  BP: ()/(52-84) 97/52     Weight: 80 kg (176 lb 5.9 oz)  Body mass index is 23.92 kg/m².    Intake/Output Summary (Last 24 hours) at 1/18/2025 1546  Last data filed at 1/18/2025 1507  Gross per 24 hour   Intake 493.57 ml   Output 3485 ml   Net -2991.43 ml         Physical Exam  Constitutional:       Appearance: He is not ill-appearing.   HENT:      Ears:      Comments: Hard of hearing     Mouth/Throat:      Mouth: Mucous membranes are moist.      Pharynx: Oropharynx is clear.   Eyes:      Extraocular Movements: Extraocular movements intact.      Conjunctiva/sclera: Conjunctivae normal.   Cardiovascular:      Rate and Rhythm: Normal rate and regular rhythm.    Pulmonary:      Effort: Pulmonary effort is normal. No respiratory distress.      Breath sounds: Normal breath sounds.   Abdominal:      General: There is no distension.      Tenderness: There is no abdominal tenderness. There is no guarding.   Musculoskeletal:         General: Normal range of motion.      Right lower leg: No edema.      Left lower leg: No edema.   Skin:     General: Skin is warm and dry.   Neurological:      General: No focal deficit present.      Mental Status: He is alert and oriented to person, place, and time.   Psychiatric:         Mood and Affect: Mood normal.         Behavior: Behavior normal.             Significant Labs: All pertinent labs within the past 24 hours have been reviewed.    Significant Imaging: I have reviewed all pertinent imaging results/findings within the past 24 hours.    Assessment and Plan     * NSTEMI (non-ST elevation myocardial infarction)  Dx:  Echo pending    Tx:  GDMT as tolerated for HR, BP , Heparin wbp  DAPT   Cardiology consult    Acute decompensated heart failure  Currently well compensated      Shortness of breath        Acute coronary syndrome  Troponin peaked 0.891      Hyponatremia  Hyponatremia is likely due to Heart Failure. The patient's most recent sodium results are listed below.  Recent Labs     01/17/25  1618 01/18/25  0443 01/18/25  1119   * 128* 128*       Plan  - Correct the sodium by 4-6mEq in 24 hours.   - Obtain the following studies: TSH, T4.  - Will treat the hyponatremia with Fluid restriction of:  1.5 liter per day, Removal of offending medications, and Lasix 80 mg IV BID   - Monitor sodium Daily.   - Patient hyponatremia is improving      Primary hypertension  Patient's blood pressure range in the last 24 hours was: BP  Min: 97/52  Max: 149/72.The patient's inpatient anti-hypertensive regimen is listed below:  Current Antihypertensives  metoprolol tartrate (LOPRESSOR) tablet 25 mg, 2 times daily, Oral  nitroGLYCERIN SL tablet 0.4  mg, Every 5 min PRN, Sublingual  furosemide injection 40 mg, Every 12 hours, Intravenous    Plan  - BP is controlled, no changes needed to their regimen  -     Acute hypoxic respiratory failure  Patient with Hypoxic Respiratory failure which is Acute.  he is not on home oxygen. Supplemental oxygen was provided and noted- Oxygen Concentration (%):  [28] 28    .   Signs/symptoms of respiratory failure include- respiratory distress. Contributing diagnoses includes - CHF Labs and images were reviewed. Patient Has not had a recent ABG. Will treat underlying causes and adjust management of respiratory failure as follows- As above       VTE Risk Mitigation (From admission, onward)           Ordered     IP VTE HIGH RISK PATIENT  Once         01/17/25 1817     Place sequential compression device  Until discontinued         01/17/25 1817     heparin 25,000 units in dextrose 5% (100 units/ml) IV bolus from bag LOW INTENSITY nomogram - [facility]  As needed (PRN)        Question:  Heparin Infusion Adjustment (DO NOT MODIFY ANSWER)  Answer:  [email or web address]\epic\Images\Pharmacy\HeparinInfusions\heparin LOW INTENSITY nomogram for [facility] [id number]    01/17/25 1613     heparin 25,000 units in dextrose 5% (100 units/ml) IV bolus from bag LOW INTENSITY nomogram - [facility]  As needed (PRN)        Question:  Heparin Infusion Adjustment (DO NOT MODIFY ANSWER)  Answer:  [email or web address]\epic\Images\Pharmacy\HeparinInfusions\heparin LOW INTENSITY nomogram for [facility] [id number].pdf    01/17/25 1613     heparin 25,000 units in dextrose 5% 250 mL (100 units/mL) infusion LOW INTENSITY nomogram - [facility]  Continuous        Question:  Begin at (units/kg/hr)  Answer:  12    01/17/25 1613                    Discharge Planning   BROOKE: 1/19/2025     Code Status: Full Code   Medical Readiness for Discharge Date:                    Please place Justification for DME        Quinton Vargas MD  Department of Hospital Medicine   [facility]

## 2025-01-18 NOTE — PLAN OF CARE
Problem: Adult Inpatient Plan of Care  Goal: Plan of Care Review  Outcome: Progressing  Goal: Absence of Hospital-Acquired Illness or Injury  Outcome: Progressing     Problem: Acute Coronary Syndrome  Goal: Optimal Adaptation to Illness  Outcome: Progressing  Goal: Normalized Cardiac Rhythm  Outcome: Progressing  Goal: Adequate Tissue Perfusion  Outcome: Progressing     Problem: Cardiac Catheterization (Diagnostic/Interventional)  Goal: Stable Heart Rate and Rhythm  Outcome: Progressing  Goal: Anesthesia/Sedation Recovery  Outcome: Progressing

## 2025-01-18 NOTE — NURSING TRANSFER
Nursing Transfer Note      1/18/2025   10:51 AM    Nurse giving handoff:KEREN Pantoja  Nurse receiving handoff:KEREN Coles    Reason patient is being transferred: Step down to telemetry    Transfer To: Telemetry     Transfer via bed    Transfer with cardiac monitoring and O2    Transported by This RN and transport tech    Transfer Vital Signs:  Blood Pressure:124/61  Heart Rate:79  O2:95% 2LNC  Temperature:98.9  Respirations:40    Telemetry: Box Number [id number]  Order for Tele Monitor? Yes    Additional Lines: Oxygen    Medicines sent: Heparin 10 units and Mg++ 2 grams IVF infusing    Any special needs or follow-up needed: Lab contacted to draw timed APTT at telemetry room    Patient belongings transferred with patient: Yes, clothing, shoes, cell phone, ID, and     Chart send with patient: Yes    Notified: pt will notify family when they call his personal cell phone    Upon arrival to floor: Receiving nurse at bedside, Heparin hand off complete, cardiac monitor applied, O2 connected, patient oriented to room, call bell in reach, and bed armed in lowest position.

## 2025-01-18 NOTE — ASSESSMENT & PLAN NOTE
IP admit meeting benchmarks , expected length of stay more than 2 midnights   GDMT as tolerated for HR, BP , Heparin wbp  DAPT   Echo , Cardiology consult, cardiac monitor, NPO after midnight

## 2025-01-18 NOTE — NURSING
Received patient from ICU department, report given by Ms. Pantoja. Patient is AA&O x 4, stable, not in distress. 4 eyes done with KEREN Diaz, no skin issues were noted. Patient has ongoing heparin drip at 10 units/kg/hr. Next due of aptt was 11am, collected by lab. Aptt shows 52.6, no rate change. Next aptt will be tomorrow morning.

## 2025-01-18 NOTE — PROGRESS NOTES
[facility] - [facility] Care  Cardiology  Progress Note    Patient Name: Miguel Mckee  MRN: [id number]  Admission Date: 1/17/2025  Hospital Length of Stay: 1 days  Code Status: Full Code   Attending Physician: Quinton Vargas MD   Primary Care Physician: [facility] -  Expected Discharge Date: 1/19/2025  Principal Problem:STEMI (ST elevation myocardial infarction)    Subjective:     Hospital Course:  01/18/2025 seen and examined this morning.  He is feeling better.  Weaned off BiPAP to nasal cannula.  Denies any chest pain.  Troponins are trending down.  Diuresing well.  No significant arrhythmias on the monitor.  Repeat EKG today reviewed      Review of Systems   Constitutional: Positive for chills and malaise/fatigue. Negative for fever.   Cardiovascular:  Positive for dyspnea on exertion. Negative for chest pain and leg swelling.   Respiratory:  Positive for cough and shortness of breath.    Gastrointestinal:  Negative for abdominal pain.       Objective:     Vital Signs (Most Recent):  Temp: 99.2 °F (37.3 °C) (01/18/25 0730)  Pulse: 91 (01/18/25 1000)  Resp: (!) 42 (01/18/25 1000)  BP: 124/61 (01/18/25 1000)  SpO2: 96 % (01/18/25 1000) Vital Signs (24h Range):  Temp:  [97.7 °F (36.5 °C)-99.2 °F (37.3 °C)] 99.2 °F (37.3 °C)  Pulse:  [] 91  Resp:  [20-76] 42  SpO2:  [93 %-100 %] 96 %  BP: ()/(55-84) 124/61     Weight: 80 kg (176 lb 5.9 oz)  Body mass index is 23.92 kg/m².    SpO2: 96 %         Intake/Output Summary (Last 24 hours) at 1/18/2025 1031  Last data filed at 1/18/2025 1000  Gross per 24 hour   Intake 493.57 ml   Output 2785 ml   Net -2291.43 ml       Lines/Drains/Airways       Peripheral Intravenous Line  Duration                  Peripheral IV - Single Lumen 01/17/25 1556 20 G Right Antecubital <1 day         Peripheral IV - Single Lumen 01/17/25 1603 20 G Left Forearm <1 day                    Physical Exam  HENT:      Head: Normocephalic and atraumatic.   Cardiovascular:       "Rate and Rhythm: Normal rate and regular rhythm.      Heart sounds: No murmur heard.  Pulmonary:      Breath sounds: Wheezing and rales present.   Musculoskeletal:      Right lower leg: No edema.      Left lower leg: No edema.   Neurological:      Mental Status: He is alert and oriented to person, place, and time.         Significant Labs: BMP:   Recent Labs   Lab 01/17/25  1618 01/18/25  0443    114*   * 128*   K 4.0 3.3*   CL 95 93*   CO2 23 21*   BUN 13 15   CREATININE 0.9 0.9   CALCIUM 8.8 8.6*   MG 1.8  --    , CMP   Recent Labs   Lab 01/17/25  1618 01/18/25  0443   * 128*   K 4.0 3.3*   CL 95 93*   CO2 23 21*    114*   BUN 13 15   CREATININE 0.9 0.9   CALCIUM 8.8 8.6*   PROT 6.6  --    ALBUMIN 3.5  --    BILITOT 0.7  --    ALKPHOS 86  --    AST 49*  --    ALT 31  --    ANIONGAP 9 14   , CBC   Recent Labs   Lab 01/17/25  1618 01/18/25  0443   WBC 4.29 6.23   HGB 14.0 13.1*   HCT 39.6* 37.4*    168   , Troponin No results for input(s): "TROPONINIHS" in the last 48 hours., and All pertinent lab results from the last 24 hours have been reviewed.    Significant Imaging: Echocardiogram: 2D echo with color flow doppler: No results found for this or any previous visit. and Transthoracic echo (TTE) complete (Cupid Only):   Results for orders placed or performed during the hospital encounter of 01/17/25   Echo   Result Value Ref Range    BSA 2.06 m2    A2C EF 39 %    A4C EF 46 %    LVOT stroke volume 62.3 cm3    LVIDd 4.9 3.5 - 6.0 cm    LV Systolic Volume 65.85 mL    LV Systolic Volume Index 32.0 mL/m2    LVIDs 3.9 2.1 - 4.0 cm    LV ESV A2C 32.06 mL    LV Diastolic Volume 111.02 mL    LV ESV A4C 16.40 mL    LV Diastolic Volume Index 53.89 mL/m2    LV EDV A2C 118.527954656133574 mL    LV EDV A4C 125.33 mL    Left Ventricular End Systolic Volume by Teichholz Method 65.85 mL    Left Ventricular End Diastolic Volume by Teichholz Method 111.02 mL    IVS 1.7 (A) 0.6 - 1.1 cm    LVOT diameter " "2.2 cm    LVOT area 3.8 cm2    FS 20.4 (A) 28 - 44 %    Left Ventricle Relative Wall Thickness 0.41 cm    PW 1.0 0.6 - 1.1 cm    LV mass 267.9 g    LV Mass Index 130.1 g/m2    MV Peak E Clement 0.41 m/s    TDI LATERAL 0.05 m/s    TDI SEPTAL 0.05 m/s    E/E' ratio 8 m/s    MV Peak A Clement 0.90 m/s    TR Max Clement 2.3 m/s    E/A ratio 0.46     E wave deceleration time 103 msec    MV "A" wave duration 110.179780691683741 msec    LV SEPTAL E/E' RATIO 8.2 m/s    LV LATERAL E/E' RATIO 8.2 m/s    LVOT peak clement 1.1 m/s    Left Ventricular Outflow Tract Mean Velocity 0.85 cm/s    Left Ventricular Outflow Tract Mean Gradient 3.13 mmHg    RV- neves basal diam 3.5 cm    RV S' 14.05 cm/s    TAPSE 1.48 cm    RV/LV Ratio 0.71 cm    LA Vol (MOD) 24 mL    JONATHAN (MOD) 12 mL/m2    RA area length vol 17.51 mL    RA Area 9.1 cm2    RA Vol 19.31 mL    AV mean gradient 3 mmHg    AV peak gradient 5 mmHg    Ao peak clement 1.1 m/s    Ao VTI 17.8 cm    LVOT peak VTI 16.4 cm    AV valve area 3.5 cm²    AV Velocity Ratio 1.00     AV index (prosthetic) 0.92     OZZIE by Velocity Ratio 3.8 cm²    MV mean gradient 2 mmHg    MV peak gradient 5 mmHg    MV stenosis pressure 1/2 time 29.72 ms    MV valve area p 1/2 method 7.40 cm2    MV valve area by continuity eq 3.64 cm2    MV VTI 17.1 cm    Triscuspid Valve Regurgitation Peak Gradient 21 mmHg    PV PEAK VELOCITY 1.01 m/s    PV peak gradient 4 mmHg    Pulmonary Valve Mean Velocity 0.75 m/s    Sinus 3.43 cm    STJ 3.15 cm    IVC diameter 1.69 cm    Mean e' 0.05 m/s    ZLVIDS 0.19     ZLVIDD -2.39     LA area A4C 9.08 cm2    LA area A2C 12.90 cm2    Mitral Valve Heart Rate 93 bpm    TV resting pulmonary artery pressure 24 mmHg    RV TB RVSP 5 mmHg    Est. RA pres 3 mmHg    Narrative      Left Ventricle: The left ventricle is normal in size. There is   eccentric hypertrophy. Regional wall motion abnormalities present. See   diagram for wall motion findings. There is moderately reduced systolic   function with a " visually estimated ejection fraction of 30 - 35%. Normal   left ventricular filling pressure.  Wall motion abnormalities can be seen   with wrap-around LAD or stress-induced cardiomyopathy    Right Ventricle: Normal right ventricular cavity size. Wall thickness   is normal. Systolic function is normal.    Pulmonary Artery: The estimated pulmonary artery systolic pressure is   24 mmHg.    IVC/SVC: Normal venous pressure at 3 mmHg.       Assessment and Plan:     Brief HPI:   1. Acute decompensated heart failure/new onset  2. Subacute MI  3. Hyponatremia  4. Respiratory distress     Plan  Troponins already trending down. EKG reviewed with evidence of Q-waves already across the anterior leads.  Could be late presentation MI versus stress-induced cardiomyopathy.  Still with rales across his chest.  Given already troponins trending down and he is chest pain-free then we will defer taking to the cath lab till his respiratory distress completely resolve.  Continue heparin drip along with aspirin and Plavix  Switch Lasix to 40 mg IV b.i.d.  Discussed with critical care team      Critical care time was 45 minutes. Interviewing the patient, reviewing chart, counseling and coordinating care   There are no hospital problems to display for this patient.    Active Diagnoses:    Diagnosis Date Noted POA    PRINCIPAL PROBLEM:  STEMI (ST elevation myocardial infarction) [I21.3] 01/17/2025 Yes    Acute hypoxic respiratory failure [J96.01] 01/17/2025 Yes    Primary hypertension [I10] 01/17/2025 Yes    Hyponatremia [E87.1] 01/17/2025 Yes      Problems Resolved During this Admission:       VTE Risk Mitigation (From admission, onward)           Ordered     IP VTE HIGH RISK PATIENT  Once         01/17/25 1817     Place sequential compression device  Until discontinued         01/17/25 1817     heparin 25,000 units in dextrose 5% (100 units/ml) IV bolus from bag LOW INTENSITY nomogram - [facility]  As needed (PRN)        Question:  Heparin  Infusion Adjustment (DO NOT MODIFY ANSWER)  Answer:  [email or web address]\epic\Images\Pharmacy\HeparinInfusions\heparin LOW INTENSITY nomogram for [facility] [id number]    01/17/25 1613     heparin 25,000 units in dextrose 5% (100 units/ml) IV bolus from bag LOW INTENSITY nomogram - [facility]  As needed (PRN)        Question:  Heparin Infusion Adjustment (DO NOT MODIFY ANSWER)  Answer:  [email or web address]\epic\Images\Pharmacy\HeparinInfusions\heparin LOW INTENSITY nomogram for [facility] [id number]    01/17/25 1613     heparin 25,000 units in dextrose 5% 250 mL (100 units/mL) infusion LOW INTENSITY nomogram - [facility]  Continuous        Question:  Begin at (units/kg/hr)  Answer:  12    01/17/25 1613                    Jorge Hernandez MD  Cardiology  [facility] - Intensive Care

## 2025-01-18 NOTE — NURSING TRANSFER
Nursing Transfer Note      1/17/2025   5:30 PM    Nurse giving handoff:KEREN Mcmahan  Nurse receiving handoff:KEREN Pantoja    Reason patient is being transferred: Escalated to ICU    Transfer From: ED    Transfer via stretcher    Transfer with cardiac monitoring and BIPAP    Transported by ED  RN X 2    Transfer Vital Signs:  Blood Pressure:141/72  Heart Rate:92  O2:98   Temperature:98.9   Respirations:47    Telemetry: Box [facility] ICU     Medicines sent: Heparin IVF    Patient belongings transferred with patient: Yes    Chart send with patient: Yes.  Clothing, shoes, ID, cell phone, and .     Notified: unable to notify next of kin due to pt not knowing numbers of children located in [place].      Patient reassessed at: 1730     Upon arrival to floor: cardiac monitor applied, patient oriented to room, call bell in reach, and bed armed in lowest position, urinal within reach, glucose checked, heparin hand off.

## 2025-01-18 NOTE — ASSESSMENT & PLAN NOTE
Hyponatremia is likely due to Heart Failure. The patient's most recent sodium results are listed below.  Recent Labs     01/17/25  1618   *     Plan  - Correct the sodium by 4-6mEq in 24 hours.   - Obtain the following studies: TSH, T4.  - Will treat the hyponatremia with Fluid restriction of:  1.5 liter per day, Removal of offending medications, and Lasix 80 mg IV BID   - Monitor sodium Daily.   - Patient hyponatremia is improving

## 2025-01-18 NOTE — HPI
Patient very pleasant 80 y/o M with hx HTN, no CAD, or lung disease prior, presents in [facility], due to resp distress , transferred from outlying hospital to here for further eval. Patient endorsed chest pain substernal , associated with progressive acute dyspnea, orthopnea and increased work of breathing . Responded to supplemental oxygen, BIPAP and Lasix

## 2025-01-18 NOTE — PLAN OF CARE
Pt AAOx4. Afebrile. HR & BP stable. NSR on monitor. Weaned from continuous BIPAP to 2L NC, O2 sats stable. Switched BIPAP to nighttime and daytime nap use. No BM overnight. UO 1275mL per urinal overnight. Labs reviewed, K 3.3, 40mEq ordered and given. Trop trending down. Heparin gtt adjusted per nomogram and currently infusing at 10units/kg/hr. Safety maintained. POC reviewed w/ pt. Care ongoing.    Problem: Adult Inpatient Plan of Care  Goal: Plan of Care Review  Outcome: Progressing  Goal: Patient-Specific Goal (Individualized)  Outcome: Progressing  Goal: Absence of Hospital-Acquired Illness or Injury  Outcome: Progressing  Goal: Optimal Comfort and Wellbeing  Outcome: Progressing  Goal: Readiness for Transition of Care  Outcome: Progressing     Problem: Acute Coronary Syndrome  Goal: Optimal Adaptation to Illness  Outcome: Progressing  Goal: Absence of Cardiac-Related Pain  Outcome: Progressing  Goal: Normalized Cardiac Rhythm  Outcome: Progressing  Goal: Effective Cardiac Pump Function  Outcome: Progressing  Goal: Adequate Tissue Perfusion  Outcome: Progressing     Problem: Skin Injury Risk Increased  Goal: Skin Health and Integrity  Outcome: Progressing     Problem: Noninvasive Ventilation Acute  Goal: Effective Unassisted Ventilation and Oxygenation  Outcome: Progressing

## 2025-01-19 LAB
ANION GAP SERPL CALC-SCNC: 13 MMOL/L (ref 8–16)
APTT PPP: 38.7 SEC (ref 21–32)
APTT PPP: 53.1 SEC (ref 21–32)
APTT PPP: 62.3 SEC (ref 21–32)
APTT PPP: 85.1 SEC (ref 21–32)
BASOPHILS # BLD AUTO: 0.01 K/UL (ref 0–0.2)
BASOPHILS NFR BLD: 0.1 % (ref 0–1.9)
BUN SERPL-MCNC: 17 MG/DL (ref 8–23)
CALCIUM SERPL-MCNC: 8.5 MG/DL (ref 8.7–10.5)
CHLORIDE SERPL-SCNC: 94 MMOL/L (ref 95–110)
CO2 SERPL-SCNC: 22 MMOL/L (ref 23–29)
CREAT SERPL-MCNC: 0.8 MG/DL (ref 0.5–1.4)
DIFFERENTIAL METHOD BLD: ABNORMAL
EOSINOPHIL # BLD AUTO: 0 K/UL (ref 0–0.5)
EOSINOPHIL NFR BLD: 0 % (ref 0–8)
ERYTHROCYTE [DISTWIDTH] IN BLOOD BY AUTOMATED COUNT: 11.9 % (ref 11.5–14.5)
EST. GFR  (NO RACE VARIABLE): >60 ML/MIN/1.73 M^2
GLUCOSE SERPL-MCNC: 109 MG/DL (ref 70–110)
HCT VFR BLD AUTO: 38.1 % (ref 40–54)
HGB BLD-MCNC: 13.4 G/DL (ref 14–18)
IMM GRANULOCYTES # BLD AUTO: 0.03 K/UL (ref 0–0.04)
IMM GRANULOCYTES NFR BLD AUTO: 0.4 % (ref 0–0.5)
LYMPHOCYTES # BLD AUTO: 0.7 K/UL (ref 1–4.8)
LYMPHOCYTES NFR BLD: 8.8 % (ref 18–48)
MAGNESIUM SERPL-MCNC: 2 MG/DL (ref 1.6–2.6)
MCH RBC QN AUTO: 32.9 PG (ref 27–31)
MCHC RBC AUTO-ENTMCNC: 35.2 G/DL (ref 32–36)
MCV RBC AUTO: 94 FL (ref 82–98)
MONOCYTES # BLD AUTO: 0.6 K/UL (ref 0.3–1)
MONOCYTES NFR BLD: 7.5 % (ref 4–15)
NEUTROPHILS # BLD AUTO: 6.2 K/UL (ref 1.8–7.7)
NEUTROPHILS NFR BLD: 83.2 % (ref 38–73)
NRBC BLD-RTO: 0 /100 WBC
PLATELET # BLD AUTO: 181 K/UL (ref 150–450)
PMV BLD AUTO: 10.5 FL (ref 9.2–12.9)
POTASSIUM SERPL-SCNC: 4.1 MMOL/L (ref 3.5–5.1)
RBC # BLD AUTO: 4.07 M/UL (ref 4.6–6.2)
SODIUM SERPL-SCNC: 129 MMOL/L (ref 136–145)
TROPONIN I SERPL DL<=0.01 NG/ML-MCNC: 0.29 NG/ML (ref 0–0.03)
WBC # BLD AUTO: 7.42 K/UL (ref 3.9–12.7)

## 2025-01-19 PROCEDURE — 63600175 PHARM REV CODE 636 W HCPCS: Performed by: INTERNAL MEDICINE

## 2025-01-19 PROCEDURE — 97116 GAIT TRAINING THERAPY: CPT | Performed by: PHYSICAL THERAPIST

## 2025-01-19 PROCEDURE — 99900035 HC TECH TIME PER 15 MIN (STAT)

## 2025-01-19 PROCEDURE — 97165 OT EVAL LOW COMPLEX 30 MIN: CPT

## 2025-01-19 PROCEDURE — 83735 ASSAY OF MAGNESIUM: CPT | Performed by: STUDENT IN AN ORGANIZED HEALTH CARE EDUCATION/TRAINING PROGRAM

## 2025-01-19 PROCEDURE — 94660 CPAP INITIATION&MGMT: CPT

## 2025-01-19 PROCEDURE — 85025 COMPLETE CBC W/AUTO DIFF WBC: CPT | Performed by: EMERGENCY MEDICINE

## 2025-01-19 PROCEDURE — 97161 PT EVAL LOW COMPLEX 20 MIN: CPT | Performed by: PHYSICAL THERAPIST

## 2025-01-19 PROCEDURE — 21400001 HC TELEMETRY ROOM

## 2025-01-19 PROCEDURE — 25000242 PHARM REV CODE 250 ALT 637 W/ HCPCS: Performed by: INTERNAL MEDICINE

## 2025-01-19 PROCEDURE — 94761 N-INVAS EAR/PLS OXIMETRY MLT: CPT

## 2025-01-19 PROCEDURE — 63600175 PHARM REV CODE 636 W HCPCS: Performed by: HOSPITALIST

## 2025-01-19 PROCEDURE — 36415 COLL VENOUS BLD VENIPUNCTURE: CPT | Mod: XB | Performed by: HOSPITALIST

## 2025-01-19 PROCEDURE — 25000003 PHARM REV CODE 250: Performed by: INTERNAL MEDICINE

## 2025-01-19 PROCEDURE — 80048 BASIC METABOLIC PNL TOTAL CA: CPT | Performed by: STUDENT IN AN ORGANIZED HEALTH CARE EDUCATION/TRAINING PROGRAM

## 2025-01-19 PROCEDURE — 63600175 PHARM REV CODE 636 W HCPCS: Performed by: EMERGENCY MEDICINE

## 2025-01-19 PROCEDURE — 85730 THROMBOPLASTIN TIME PARTIAL: CPT | Performed by: HOSPITALIST

## 2025-01-19 PROCEDURE — 97535 SELF CARE MNGMENT TRAINING: CPT

## 2025-01-19 PROCEDURE — 99233 SBSQ HOSP IP/OBS HIGH 50: CPT | Mod: ,,, | Performed by: INTERNAL MEDICINE

## 2025-01-19 PROCEDURE — 84484 ASSAY OF TROPONIN QUANT: CPT | Performed by: HOSPITALIST

## 2025-01-19 RX ORDER — FUROSEMIDE 10 MG/ML
40 INJECTION INTRAMUSCULAR; INTRAVENOUS ONCE
Status: COMPLETED | OUTPATIENT
Start: 2025-01-19 | End: 2025-01-19

## 2025-01-19 RX ORDER — FUROSEMIDE 10 MG/ML
80 INJECTION INTRAMUSCULAR; INTRAVENOUS EVERY 12 HOURS
Status: DISCONTINUED | OUTPATIENT
Start: 2025-01-19 | End: 2025-01-20

## 2025-01-19 RX ORDER — IPRATROPIUM BROMIDE AND ALBUTEROL SULFATE 2.5; .5 MG/3ML; MG/3ML
3 SOLUTION RESPIRATORY (INHALATION) EVERY 8 HOURS
Status: DISCONTINUED | OUTPATIENT
Start: 2025-01-19 | End: 2025-01-20 | Stop reason: HOSPADM

## 2025-01-19 RX ORDER — MORPHINE SULFATE 2 MG/ML
1 INJECTION, SOLUTION INTRAMUSCULAR; INTRAVENOUS EVERY 4 HOURS PRN
Status: DISCONTINUED | OUTPATIENT
Start: 2025-01-19 | End: 2025-01-20 | Stop reason: HOSPADM

## 2025-01-19 RX ADMIN — NITROGLYCERIN 0.4 MG: 0.4 TABLET, ORALLY DISINTEGRATING SUBLINGUAL at 07:01

## 2025-01-19 RX ADMIN — CLOPIDOGREL BISULFATE 75 MG: 75 TABLET ORAL at 09:01

## 2025-01-19 RX ADMIN — HEPARIN SODIUM 12 UNITS/KG/HR: 10000 INJECTION, SOLUTION INTRAVENOUS at 04:01

## 2025-01-19 RX ADMIN — MORPHINE SULFATE 1 MG: 2 INJECTION, SOLUTION INTRAMUSCULAR; INTRAVENOUS at 07:01

## 2025-01-19 RX ADMIN — ASPIRIN 81 MG: 81 TABLET, COATED ORAL at 09:01

## 2025-01-19 RX ADMIN — MUPIROCIN: 20 OINTMENT TOPICAL at 08:01

## 2025-01-19 RX ADMIN — POLYETHYLENE GLYCOL (3350) 17 G: 17 POWDER, FOR SOLUTION ORAL at 09:01

## 2025-01-19 RX ADMIN — METOPROLOL TARTRATE 25 MG: 25 TABLET, FILM COATED ORAL at 09:01

## 2025-01-19 RX ADMIN — FUROSEMIDE 40 MG: 10 INJECTION, SOLUTION INTRAMUSCULAR; INTRAVENOUS at 11:01

## 2025-01-19 RX ADMIN — ATORVASTATIN CALCIUM 80 MG: 40 TABLET, FILM COATED ORAL at 09:01

## 2025-01-19 RX ADMIN — FUROSEMIDE 40 MG: 10 INJECTION, SOLUTION INTRAMUSCULAR; INTRAVENOUS at 09:01

## 2025-01-19 RX ADMIN — METOPROLOL TARTRATE 25 MG: 25 TABLET, FILM COATED ORAL at 08:01

## 2025-01-19 RX ADMIN — MUPIROCIN: 20 OINTMENT TOPICAL at 09:01

## 2025-01-19 RX ADMIN — FUROSEMIDE 80 MG: 10 INJECTION, SOLUTION INTRAMUSCULAR; INTRAVENOUS at 08:01

## 2025-01-19 NOTE — ASSESSMENT & PLAN NOTE
Patient's blood pressure range in the last 24 hours was: BP  Min: 114/63  Max: 117/67.The patient's inpatient anti-hypertensive regimen is listed below:  Current Antihypertensives  metoprolol tartrate (LOPRESSOR) tablet 25 mg, 2 times daily, Oral  nitroGLYCERIN SL tablet 0.4 mg, Every 5 min PRN, Sublingual  furosemide injection 80 mg, Every 12 hours, Intravenous    Plan  - BP is controlled, no changes needed to their regimen  -

## 2025-01-19 NOTE — PROGRESS NOTES
[facility] - [facility]  Cardiology  Progress Note    Patient Name: Miguel Mckee  MRN: [id number]  Admission Date: 1/17/2025  Hospital Length of Stay: 2 days  Code Status: Full Code   Attending Physician: Quinton Vargas MD   Primary Care Physician: [facility] -  Expected Discharge Date: 1/19/2025  Principal Problem:NSTEMI (non-ST elevation myocardial infarction)    Subjective:     Hospital Course:  01/18/2025 seen and examined this morning.  He is feeling better.  Weaned off BiPAP to nasal cannula.  Denies any chest pain.  Troponins are trending down.  Diuresing well.  No significant arrhythmias on the monitor.  Repeat EKG today reviewed    1/19/2025: Seen and examined this morning.  He is doing okay.  He had brief episode of CP that responded to nitro.  Troponins trending down.  EKG without acute changes. he is chest pain-free now    Review of Systems   Constitutional: Positive for chills and malaise/fatigue. Negative for fever.   Cardiovascular:  Positive for dyspnea on exertion. Negative for chest pain and leg swelling.   Respiratory:  Positive for cough and shortness of breath.    Gastrointestinal:  Negative for abdominal pain.       Objective:     Vital Signs (Most Recent):  Temp: 98.6 °F (37 °C) (01/19/25 1140)  Pulse: 76 (01/19/25 1140)  Resp: 18 (01/19/25 1140)  BP: 114/63 (01/19/25 1140)  SpO2: 96 % (01/19/25 1140) Vital Signs (24h Range):  Temp:  [98 °F (36.7 °C)-98.7 °F (37.1 °C)] 98.6 °F (37 °C)  Pulse:  [69-87] 76  Resp:  [18-25] 18  SpO2:  [96 %-97 %] 96 %  BP: (114-117)/(57-68) 114/63     Weight: 80 kg (176 lb 5.9 oz)  Body mass index is 23.92 kg/m².    SpO2: 96 %         Intake/Output Summary (Last 24 hours) at 1/19/2025 1219  Last data filed at 1/19/2025 0715  Gross per 24 hour   Intake 87.93 ml   Output 1600 ml   Net -1512.07 ml       Lines/Drains/Airways       Peripheral Intravenous Line  Duration                  Peripheral IV - Single Lumen 01/17/25 1556 20 G Right Antecubital  "1 day         Peripheral IV - Single Lumen 01/17/25 1603 20 G Left Forearm 1 day                    Physical Exam  HENT:      Head: Normocephalic and atraumatic.   Cardiovascular:      Rate and Rhythm: Normal rate and regular rhythm.      Heart sounds: No murmur heard.  Pulmonary:      Breath sounds: Wheezing, rhonchi and rales present.   Musculoskeletal:      Right lower leg: No edema.      Left lower leg: No edema.   Neurological:      Mental Status: He is alert and oriented to person, place, and time.         Significant Labs: BMP:   Recent Labs   Lab 01/17/25 1618 01/18/25  0443 01/18/25  1119 01/19/25  0603    114* 146* 109   * 128* 128* 129*   K 4.0 3.3* 3.8 4.1   CL 95 93* 94* 94*   CO2 23 21* 21* 22*   BUN 13 15 18 17   CREATININE 0.9 0.9 1.0 0.8   CALCIUM 8.8 8.6* 8.6* 8.5*   MG 1.8  --   --  2.0   , CMP   Recent Labs   Lab 01/17/25 1618 01/18/25 0443 01/18/25  1119 01/19/25  0603   * 128* 128* 129*   K 4.0 3.3* 3.8 4.1   CL 95 93* 94* 94*   CO2 23 21* 21* 22*    114* 146* 109   BUN 13 15 18 17   CREATININE 0.9 0.9 1.0 0.8   CALCIUM 8.8 8.6* 8.6* 8.5*   PROT 6.6  --   --   --    ALBUMIN 3.5  --   --   --    BILITOT 0.7  --   --   --    ALKPHOS 86  --   --   --    AST 49*  --   --   --    ALT 31  --   --   --    ANIONGAP 9 14 13 13   , CBC   Recent Labs   Lab 01/17/25 1618 01/18/25  0443 01/19/25  0603   WBC 4.29 6.23 7.42   HGB 14.0 13.1* 13.4*   HCT 39.6* 37.4* 38.1*    168 181   , Troponin No results for input(s): "TROPONINIHS" in the last 48 hours., and All pertinent lab results from the last 24 hours have been reviewed.    Significant Imaging: Echocardiogram: 2D echo with color flow doppler: No results found for this or any previous visit. and Transthoracic echo (TTE) complete (Cupid Only):   Results for orders placed or performed during the hospital encounter of 01/17/25   Echo   Result Value Ref Range    BSA 2.06 m2    A2C EF 39 %    A4C EF 46 %    LVOT stroke " "volume 62.3 cm3    LVIDd 4.9 3.5 - 6.0 cm    LV Systolic Volume 65.85 mL    LV Systolic Volume Index 32.0 mL/m2    LVIDs 3.9 2.1 - 4.0 cm    LV ESV A2C 32.06 mL    LV Diastolic Volume 111.02 mL    LV ESV A4C 16.40 mL    LV Diastolic Volume Index 53.89 mL/m2    LV EDV A2C 118.465871225092651 mL    LV EDV A4C 125.33 mL    Left Ventricular End Systolic Volume by Teichholz Method 65.85 mL    Left Ventricular End Diastolic Volume by Teichholz Method 111.02 mL    IVS 1.7 (A) 0.6 - 1.1 cm    LVOT diameter 2.2 cm    LVOT area 3.8 cm2    FS 20.4 (A) 28 - 44 %    Left Ventricle Relative Wall Thickness 0.41 cm    PW 1.0 0.6 - 1.1 cm    LV mass 267.9 g    LV Mass Index 130.1 g/m2    MV Peak E Clement 0.41 m/s    TDI LATERAL 0.05 m/s    TDI SEPTAL 0.05 m/s    E/E' ratio 8 m/s    MV Peak A Clement 0.90 m/s    TR Max Clement 2.3 m/s    E/A ratio 0.46     E wave deceleration time 103 msec    MV "A" wave duration 110.668416094467446 msec    LV SEPTAL E/E' RATIO 8.2 m/s    LV LATERAL E/E' RATIO 8.2 m/s    LVOT peak clement 1.1 m/s    Left Ventricular Outflow Tract Mean Velocity 0.85 cm/s    Left Ventricular Outflow Tract Mean Gradient 3.13 mmHg    RV- neves basal diam 3.5 cm    RV S' 14.05 cm/s    TAPSE 1.48 cm    RV/LV Ratio 0.71 cm    LA Vol (MOD) 24 mL    JONATHAN (MOD) 12 mL/m2    RA area length vol 17.51 mL    RA Area 9.1 cm2    RA Vol 19.31 mL    AV mean gradient 3 mmHg    AV peak gradient 5 mmHg    Ao peak clement 1.1 m/s    Ao VTI 17.8 cm    LVOT peak VTI 16.4 cm    AV valve area 3.5 cm²    AV Velocity Ratio 1.00     AV index (prosthetic) 0.92     OZZIE by Velocity Ratio 3.8 cm²    MV mean gradient 2 mmHg    MV peak gradient 5 mmHg    MV stenosis pressure 1/2 time 29.72 ms    MV valve area p 1/2 method 7.40 cm2    MV valve area by continuity eq 3.64 cm2    MV VTI 17.1 cm    Triscuspid Valve Regurgitation Peak Gradient 21 mmHg    PV PEAK VELOCITY 1.01 m/s    PV peak gradient 4 mmHg    Pulmonary Valve Mean Velocity 0.75 m/s    Sinus 3.43 cm    STJ 3.15 " cm    IVC diameter 1.69 cm    Mean e' 0.05 m/s    ZLVIDS 0.19     ZLVIDD -2.39     LA area A4C 9.08 cm2    LA area A2C 12.90 cm2    Mitral Valve Heart Rate 93 bpm    TV resting pulmonary artery pressure 24 mmHg    RV TB RVSP 5 mmHg    Est. RA pres 3 mmHg    Narrative      Left Ventricle: The left ventricle is normal in size. There is   eccentric hypertrophy. Regional wall motion abnormalities present. See   diagram for wall motion findings. There is moderately reduced systolic   function with a visually estimated ejection fraction of 30 - 35%. Normal   left ventricular filling pressure.  Wall motion abnormalities can be seen   with wrap-around LAD or stress-induced cardiomyopathy    Right Ventricle: Normal right ventricular cavity size. Wall thickness   is normal. Systolic function is normal.    Pulmonary Artery: The estimated pulmonary artery systolic pressure is   24 mmHg.    IVC/SVC: Normal venous pressure at 3 mmHg.       Assessment and Plan:     Brief HPI:   1. Acute decompensated heart failure/new onset  2. Subacute MI  3. Hyponatremia  4. Respiratory distress     Plan  Troponins already trending down. EKG reviewed with evidence of Q-waves already across the anterior leads.  Could be late presentation MI versus stress-induced cardiomyopathy.  Still with significant rales and rhonchi across his chest.  Given already troponins trending down and he is chest pain-free then we will defer taking to the cath lab till his respiratory distress completely resolve.  Continue heparin drip along with aspirin and Plavix  Increase IV Lasix to 80 mg b.i.d.  Start breathing treatment.  Consider evaluation for possible upper respiratory infection.  A lot of secretions and rhonchi appreciated    Tentatively plan for left heart catheterization Tuesday.   Discussed with primary team      Active Diagnoses:    Diagnosis Date Noted POA    PRINCIPAL PROBLEM:  NSTEMI (non-ST elevation myocardial infarction) [I21.4] 01/17/2025 Yes     Acute coronary syndrome [I24.9] 01/18/2025 Yes    Shortness of breath [R06.02] 01/18/2025 Yes    Acute decompensated heart failure [I50.9] 01/18/2025 Yes    Acute hypoxic respiratory failure [J96.01] 01/17/2025 Yes    Primary hypertension [I10] 01/17/2025 Yes    Hyponatremia [E87.1] 01/17/2025 Yes      Problems Resolved During this Admission:       VTE Risk Mitigation (From admission, onward)           Ordered     IP VTE HIGH RISK PATIENT  Once         01/17/25 1817     Place sequential compression device  Until discontinued         01/17/25 1817     heparin 25,000 units in dextrose 5% (100 units/ml) IV bolus from bag LOW INTENSITY nomogram - [facility]  As needed (PRN)        Question:  Heparin Infusion Adjustment (DO NOT MODIFY ANSWER)  Answer:  [email or web address]\epic\Images\Pharmacy\HeparinInfusions\heparin LOW INTENSITY nomogram for [facility] [id number]    01/17/25 1613     heparin 25,000 units in dextrose 5% (100 units/ml) IV bolus from bag LOW INTENSITY nomogram - [facility]  As needed (PRN)        Question:  Heparin Infusion Adjustment (DO NOT MODIFY ANSWER)  Answer:  [email or web address]\epic\Images\Pharmacy\HeparinInfusions\heparin LOW INTENSITY nomogram for [facility] [id number]    01/17/25 1613     heparin 25,000 units in dextrose 5% 250 mL (100 units/mL) infusion LOW INTENSITY nomogram - [facility]  Continuous        Question:  Begin at (units/kg/hr)  Answer:  12    01/17/25 1613                    Jorge Hernandez MD  Cardiology  [place] - Intensive Care

## 2025-01-19 NOTE — PT/OT/SLP EVAL
Physical Therapy Evaluation    Patient Name:  Miguel Mckee   MRN:  [id number]    Recommendations:     Discharge Recommendations: Low Intensity Therapy   Discharge Equipment Recommendations: none   Barriers to discharge: Decreased caregiver support    Assessment:     Miguel Mckee is a 79 y.o. male admitted with a medical diagnosis of NSTEMI (non-ST elevation myocardial infarction).  He presents with the following impairments/functional limitations: weakness, impaired endurance, gait instability, impaired balance, decreased safety awareness Patient was able to perform all bedmobility SPV. He was able to stand with SPV and amb to RR with 1 HHA to RR with OT. He was then able to amb ~60 total feet with 1 HHA for stability with progressive weight placed through his R hand on therapist and he reported progressive SOB as he walker. He was on portable O2 of 2 L throughout gait and OOB mobility. He was able to return safely and xfer back to supine with SPV. LIT pending, has cane at home and may benefit from gait trail with cane before DC. .    Rehab Prognosis: Good; patient would benefit from acute skilled PT services to address these deficits and reach maximum level of function.    Recent Surgery: * No surgery found *      Plan:     During this hospitalization, patient to be seen 4 x/week to address the identified rehab impairments via gait training, therapeutic activities, therapeutic exercises, neuromuscular re-education and progress toward the following goals:    Plan of Care Expires:  02/16/25    Subjective     Chief Complaint: still feels short of breath  Patient/Family Comments/goals: none present  Pain/Comfort:  Pain Rating 1: 0/10    Patients cultural, spiritual, Mormonism conflicts given the current situation: no    Living Environment:  Lives alone in 1 level home with 4 steps to enter with rail on right when going inside  Prior to admission, patients level of function was indep but did not drive.  Equipment  used at home: none.  DME owned (not currently used): single point cane.  Upon discharge, patient will have assistance from friends.    Objective:     Communicated with NSG prior to session.  Patient found supine with oxygen, peripheral IV, telemetry  upon PT entry to room.    General Precautions: Standard, fall, hearing impaired  Orthopedic Precautions:N/A   Braces: N/A  Respiratory Status: Nasal cannula, flow 2 L/min    Exams:  Postural Exam:  Patient presented with the following abnormalities: -       Rounded shoulders  -       Forward head  Sensation: -       Intact  RLE ROM: WFL  RLE Strength: WFL  LLE ROM: WFL  LLE Strength: WFL    Patient donned non slip socks and gait belt for OOB mobility    Functional Mobility:  Bed Mobility:  Supine to Sit: supervision  Sit to Supine: supervision  Transfers:  Sit to Stand:  contact guard assistance with hand-held assist  Gait: amb to RR with 1 HHA and then 60 feet in hallway with Progressive need for HHA and slight balance checks while turning around and had progressive SOB as he ambulated      AM-PAC 6 CLICK MOBILITY  Total Score:21       Treatment & Education:   PT educated patient:  PT plan of care/role of PT  Safety with OOB mobility  Use of SPC for household and community ambulation -   Energy conservation techniques   Discharge disposition    Pt  verbalized understanding       Patient left supine with call button in reach and bed alarm on.    GOALS:   Multidisciplinary Problems       Physical Therapy Goals          Problem: Physical Therapy    Goal Priority Disciplines Outcome Interventions   Physical Therapy Goal     PT, PT/OT Progressing    Description: Goals to be met by: 2025     Patient will increase functional independence with mobility by performin. Sit to stand transfer with [place]  2. Gait  x 150 feet with Modified [place] using No Assistive Device.   3. Ascend/descend 4 stair with right Handrails Modified [place] using LRAD  without SOB  4. Stand for 15 minutes with Modified [place] using LRAD without SOB                         DME Justifications:  No DME recommended requiring DME justifications    History:     Past Medical History:   Diagnosis Date    CHF (congestive heart failure)     Coronary artery disease     Hypertension     Thyroid disease        Past Surgical History:   Procedure Laterality Date    APPENDECTOMY      NEPHRECTOMY Left        Time Tracking:     PT Received On: 01/19/25  PT Start Time: 1010     PT Stop Time: 1030  PT Total Time (min): 20 min     Billable Minutes: Evaluation 10 and Gait Training 10      01/19/2025

## 2025-01-19 NOTE — PROGRESS NOTES
[facility] Medicine  Progress Note    Patient Name: Miguel Mckee  MRN: [id number]  Patient Class: IP- Inpatient   Admission Date: 1/17/2025  Length of Stay: 2 days  Attending Physician: Quinton Vargas MD  Primary Care Provider: [facility] -        Subjective     Principal Problem:NSTEMI (non-ST elevation myocardial infarction)  Acute Condition: guarded        HPI:  Patient very pleasant 80 y/o M with hx HTN, no CAD, or lung disease prior, presents in [facility], due to resp distress , transferred from [facility] to here for further eval. Patient endorsed chest pain substernal , associated with progressive acute dyspnea, orthopnea and increased work of breathing . Responded to supplemental oxygen, BIPAP and Lasix     Overview/Hospital Course:  No notes on file    Interval History:     MARK  Had 7/10 chest pain resolved with nitro this AM. Ambulating well. Diuresing well.   No dyspnea  No f/c/n/v  No complaints at this time    Review of Systems  Objective:     Vital Signs (Most Recent):  Temp: 98.6 °F (37 °C) (01/19/25 1140)  Pulse: 76 (01/19/25 1140)  Resp: 18 (01/19/25 1140)  BP: 114/63 (01/19/25 1140)  SpO2: 96 % (01/19/25 1140) Vital Signs (24h Range):  Temp:  [98 °F (36.7 °C)-98.7 °F (37.1 °C)] 98.6 °F (37 °C)  Pulse:  [69-87] 76  Resp:  [18-25] 18  SpO2:  [96 %-97 %] 96 %  BP: (114-117)/(57-68) 114/63     Weight: 80 kg (176 lb 5.9 oz)  Body mass index is 23.92 kg/m².    Intake/Output Summary (Last 24 hours) at 1/19/2025 1232  Last data filed at 1/19/2025 0715  Gross per 24 hour   Intake 87.93 ml   Output 1350 ml   Net -1262.07 ml         Physical Exam  Constitutional:       General: He is not in acute distress.     Appearance: He is not ill-appearing or toxic-appearing.   HENT:      Ears:      Comments: Hard of hearing     Mouth/Throat:      Mouth: Mucous membranes are moist.      Pharynx: Oropharynx is clear.   Eyes:      Extraocular Movements: Extraocular movements  intact.      Conjunctiva/sclera: Conjunctivae normal.   Cardiovascular:      Rate and Rhythm: Normal rate and regular rhythm.   Pulmonary:      Effort: Pulmonary effort is normal. No respiratory distress.      Breath sounds: Rhonchi present.   Abdominal:      General: There is no distension.      Tenderness: There is no abdominal tenderness. There is no guarding.   Musculoskeletal:         General: Normal range of motion.      Right lower leg: No edema.      Left lower leg: No edema.   Skin:     General: Skin is warm and dry.   Neurological:      General: No focal deficit present.      Mental Status: He is alert and oriented to person, place, and time.   Psychiatric:         Mood and Affect: Mood normal.         Behavior: Behavior normal.             Significant Labs: All pertinent labs within the past 24 hours have been reviewed.    Significant Imaging: I have reviewed all pertinent imaging results/findings within the past 24 hours.    Assessment and Plan     * NSTEMI (non-ST elevation myocardial infarction)  Dx:  Troponin peaked at 0.981  Echo pending  Plan for cath on tuesday    Tx:  GDMT as tolerated for HR, BP , Heparin wbp  DAPT   Cardiology consult      Acute decompensated heart failure  See: NSTEMI      Shortness of breath        Acute coronary syndrome  Troponin peaked 0.891      Hyponatremia  Hyponatremia is likely due to Heart Failure. The patient's most recent sodium results are listed below.  Recent Labs     01/18/25  0443 01/18/25  1119 01/19/25  0603   * 128* 129*       Plan  - Correct the sodium by 4-6mEq in 24 hours.   - Obtain the following studies: TSH, T4.  - Will treat the hyponatremia with Fluid restriction of:  1.5 liter per day, Removal of offending medications, and Lasix 80 mg IV BID   - Monitor sodium Daily.   - Patient hyponatremia is improving      Primary hypertension  Patient's blood pressure range in the last 24 hours was: BP  Min: 114/63  Max: 117/67.The patient's inpatient  anti-hypertensive regimen is listed below:  Current Antihypertensives  metoprolol tartrate (LOPRESSOR) tablet 25 mg, 2 times daily, Oral  nitroGLYCERIN SL tablet 0.4 mg, Every 5 min PRN, Sublingual  furosemide injection 80 mg, Every 12 hours, Intravenous    Plan  - BP is controlled, no changes needed to their regimen  -     Acute hypoxic respiratory failure  Patient with Hypoxic Respiratory failure which is Acute.  he is not on home oxygen. Supplemental oxygen was provided and noted-      .   Signs/symptoms of respiratory failure include- respiratory distress. Contributing diagnoses includes - CHF Labs and images were reviewed. Patient Has not had a recent ABG. Will treat underlying causes and adjust management of respiratory failure as follows- As above     Follow up repeat CXR  Increase Lasix dosage      VTE Risk Mitigation (From admission, onward)           Ordered     IP VTE HIGH RISK PATIENT  Once         01/17/25 1817     Place sequential compression device  Until discontinued         01/17/25 1817     heparin 25,000 units in dextrose 5% (100 units/ml) IV bolus from bag LOW INTENSITY nomogram - [facility]  As needed (PRN)        Question:  Heparin Infusion Adjustment (DO NOT MODIFY ANSWER)  Answer:  [email or web address]\Images\Pharmacy\HeparinInfusions\heparin LOW INTENSITY nomogram for [facility] [id number].pdf    01/17/25 1613     heparin 25,000 units in dextrose 5% (100 units/ml) IV bolus from bag LOW INTENSITY nomogram - [facility]  As needed (PRN)        Question:  Heparin Infusion Adjustment (DO NOT MODIFY ANSWER)  Answer:  \[email or web address]\Images\Pharmacy\HeparinInfusions\heparin LOW INTENSITY nomogram for [facility] [id number].pdf    01/17/25 1613     heparin 25,000 units in dextrose 5% 250 mL (100 units/mL) infusion LOW INTENSITY nomogram - [facility]  Continuous        Question:  Begin at (units/kg/hr)  Answer:  12    01/17/25 1613                    Discharge Planning   BROOKE: 1/19/2025     Code Status: Full Code   Medical Readiness for  Discharge Date:                    Please place Justification for DME        Quinton Vargas MD  Department of Hospital Medicine   [facility]

## 2025-01-19 NOTE — PLAN OF CARE
Problem: Occupational Therapy  Goal: Occupational Therapy Goal  Description: Goals to be met by: Discharge     Patient will increase functional independence with ADLs by performing:    LE Dressing with Modified [place].  Grooming while seated at sink with Modified [place].  Toileting from toilet with Modified [place] for hygiene and clothing management.   Toilet transfer to toilet with Modified [place].    Outcome: Progressing

## 2025-01-19 NOTE — PT/OT/SLP EVAL
Occupational Therapy   Evaluation    Name: Miguel Mckee  MRN: [id number]  Admitting Diagnosis: NSTEMI (non-ST elevation myocardial infarction)  Recent Surgery: * No surgery found *      Recommendations:     Discharge Recommendations: No Therapy Indicated  Discharge Equipment Recommendations:  none  Barriers to discharge:  None    Assessment:     Miguel Mckee is a 79 y.o. male with a medical diagnosis of NSTEMI (non-ST elevation myocardial infarction).  He presents with decreases functional endurance and decreased independence with ADLs and functional mobility. Performance deficits affecting function: weakness, impaired endurance, impaired self care skills, impaired cardiopulmonary response to activity, impaired balance.      Rehab Prognosis: Good; patient would benefit from acute skilled OT services to address these deficits and reach maximum level of function.       Plan:     Patient to be seen  (2-3x/wk) to address the above listed problems via self-care/home management, therapeutic activities, therapeutic exercises  Plan of Care Expires: 01/31/25  Plan of Care Reviewed with: patient    Subjective     Chief Complaint: He would like to get up and use the toilet  Patient/Family Comments/goals: To return home    Occupational Profile:  Living Environment: Pt lives alone (wife recently passed) in [facility] with 4STE, has tub/shower with SC  Previous level of function: Independent with ADLs and functional mobility, does not drive but has friends to assist with transportation and groceries   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  Patient found HOB elevated with oxygen, peripheral IV, telemetry upon OT  entry to room.    General Precautions: Standard, fall  Orthopedic Precautions: N/A  Braces: N/A  Respiratory Status: Nasal cannula, flow 2 L/min    Occupational Performance:    Cognitive/Visual Perceptual:  Cognitive/Psychosocial Skills:     Oriented to: Person, Place, Time, and Situation   Follows Commands/attention:Follows multistep  commands  Communication: clear/fluent  Memory: No Deficits noted  Safety awareness/insight to disability: intact  Mood/Affect/Coping skills/emotional control: Appropriate to situation and Pleasant    Physical Exam:  Dominant hand: Right  Upper Extremity Range of Motion:     Right Upper Extremity: WFL  Left Upper Extremity: WFL  Upper Extremity Strength:   Right Upper Extremity: WFL  Left Upper Extremity: WFL   Strength:   Right Upper Extremity: WFL  Left Upper Extremity: WFL  Fine Motor Coordination:    -       Intact    Activities of Daily Living:  Feeding: independence    Grooming:  set up assist      Bathing: minimum assistance    Upper Body Dressing:  set up assist     Lower Body Dressing: stand by assistance    Toileting: stand by assistance      Bed Mobility:    Patient completed Rolling/Turning to Right with supervision  Patient completed Scooting/Bridging with supervision  Patient completed Supine to Sit with supervision  Patient completed Sit to Supine with supervision    Functional Mobility/Transfers:  Patient completed Sit <> Stand Transfer with contact guard assistance  with  no assistive device   Patient completed Toilet Transfer Step Transfer technique with contact guard assistance with  no AD  Functional Mobility: Pt ambulated to bathroom with CGA and no AD, no LOB noted; pt requires CGA-SBA for standing balance to urinate in toilet.; Defer to PT for further details    Treatment and Education:  Patient educated on role of acute care OT and OT POC, safety while in hospital including calling nurse for mobility, and call light usage  Patient educated about importance  of OOB mobility and remaining up in chair most of the day.  Patient educated about pursed lip breathing technique and cued for use with mobility.  Patient was educated on benefits of occupational therapy on continued improvement in patient's quality of life.  All questions answered to satisfaction, within scope of OT practice.   Patient voiced no other concerns.  RN notified of OT therapy session.    AMPAC 6 Click ADL:  AMPAC Total Score: 21    Patient left HOB elevated with all lines intact, call button in reach, bed alarm on, and nursing notified    GOALS:   Multidisciplinary Problems       Occupational Therapy Goals          Problem: Occupational Therapy    Goal Priority Disciplines Outcome Interventions   Occupational Therapy Goal     OT, PT/OT Progressing    Description: Goals to be met by: Discharge     Patient will increase functional independence with ADLs by performing:    LE Dressing with Modified [place].  Grooming while seated at sink with Modified [place].  Toileting from toilet with Modified [place] for hygiene and clothing management.   Toilet transfer to toilet with Modified [place].                         DME Justifications:  No DME recommended requiring DME justifications    History:     Past Medical History:   Diagnosis Date    CHF (congestive heart failure)     Coronary artery disease     Hypertension     Thyroid disease          Past Surgical History:   Procedure Laterality Date    APPENDECTOMY      NEPHRECTOMY Left        Time Tracking:     OT Date of Treatment: 01/19/25  OT Start Time: 1010  OT Stop Time: 1029  OT Total Time (min): 19 min    Billable Minutes:Evaluation 11  Self Care/Home Management 8    1/19/2025

## 2025-01-19 NOTE — SUBJECTIVE & OBJECTIVE
Interval History:     MARK  Had 7/10 chest pain resolved with nitro this AM. Ambulating well. Diuresing well.   No dyspnea  No f/c/n/v  No complaints at this time    Review of Systems  Objective:     Vital Signs (Most Recent):  Temp: 98.6 °F (37 °C) (01/19/25 1140)  Pulse: 76 (01/19/25 1140)  Resp: 18 (01/19/25 1140)  BP: 114/63 (01/19/25 1140)  SpO2: 96 % (01/19/25 1140) Vital Signs (24h Range):  Temp:  [98 °F (36.7 °C)-98.7 °F (37.1 °C)] 98.6 °F (37 °C)  Pulse:  [69-87] 76  Resp:  [18-25] 18  SpO2:  [96 %-97 %] 96 %  BP: (114-117)/(57-68) 114/63     Weight: 80 kg (176 lb 5.9 oz)  Body mass index is 23.92 kg/m².    Intake/Output Summary (Last 24 hours) at 1/19/2025 1232  Last data filed at 1/19/2025 0715  Gross per 24 hour   Intake 87.93 ml   Output 1350 ml   Net -1262.07 ml         Physical Exam  Constitutional:       General: He is not in acute distress.     Appearance: He is not ill-appearing or toxic-appearing.   HENT:      Ears:      Comments: Hard of hearing     Mouth/Throat:      Mouth: Mucous membranes are moist.      Pharynx: Oropharynx is clear.   Eyes:      Extraocular Movements: Extraocular movements intact.      Conjunctiva/sclera: Conjunctivae normal.   Cardiovascular:      Rate and Rhythm: Normal rate and regular rhythm.   Pulmonary:      Effort: Pulmonary effort is normal. No respiratory distress.      Breath sounds: Rhonchi present.   Abdominal:      General: There is no distension.      Tenderness: There is no abdominal tenderness. There is no guarding.   Musculoskeletal:         General: Normal range of motion.      Right lower leg: No edema.      Left lower leg: No edema.   Skin:     General: Skin is warm and dry.   Neurological:      General: No focal deficit present.      Mental Status: He is alert and oriented to person, place, and time.   Psychiatric:         Mood and Affect: Mood normal.         Behavior: Behavior normal.             Significant Labs: All pertinent labs within the past 24  hours have been reviewed.    Significant Imaging: I have reviewed all pertinent imaging results/findings within the past 24 hours.

## 2025-01-19 NOTE — PLAN OF CARE
Problem: Physical Therapy  Goal: Physical Therapy Goal  Description: Goals to be met by: 2025     Patient will increase functional independence with mobility by performin. Sit to stand transfer with [place]  2. Gait  x 150 feet with Modified [place] using No Assistive Device.   3. Ascend/descend 4 stair with right Handrails Modified [place] using LRAD without SOB  4. Stand for 15 minutes with Modified [place] using LRAD without SOB    Outcome: Progressing     Patient was able to perform all bedmobility SPV. He was able to stand with SPV and amb to RR with 1 HHA to RR with OT. He was then able to amb ~60 total feet with 1 HHA for stability with progressive weight placed through his R hand on therapist and he reported progressive SOB as he walker. He was on portable O2 of 2 L throughout gait and OOB mobility. He was able to return safely and xfer back to supine with SPV. LIT pending, has cane at home and may benefit from gait trail with cane before DC.

## 2025-01-19 NOTE — ASSESSMENT & PLAN NOTE
Dx:  Troponin peaked at 0.981  Echo pending  Plan for cath on tuesday    Tx:  GDMT as tolerated for HR, BP , Heparin wbp  DAPT   Cardiology consult

## 2025-01-19 NOTE — ASSESSMENT & PLAN NOTE
Hyponatremia is likely due to Heart Failure. The patient's most recent sodium results are listed below.  Recent Labs     01/18/25  0443 01/18/25  1119 01/19/25  0603   * 128* 129*       Plan  - Correct the sodium by 4-6mEq in 24 hours.   - Obtain the following studies: TSH, T4.  - Will treat the hyponatremia with Fluid restriction of:  1.5 liter per day, Removal of offending medications, and Lasix 80 mg IV BID   - Monitor sodium Daily.   - Patient hyponatremia is improving

## 2025-01-19 NOTE — NURSING
At 736am Patient complained of left sided chest pain 7/10, vitals are stable except for the RR 28. Informed Dr Vargas via secure chat. EKG stat done, and informed  about stat trop I, she said they will  just add the test from previous 6am blood draw. Nitroglycerin given. Chest pain relieved as per patient.    APTT result was 38.7. Counterchecked with GEORGETTE Arroyo, bolus of 30units/kg given and rate was increased by 2 units. Next blood draw at 1400h.     Patient still on heparin drip, followed protocol. Assisted patient to ambulate. Was able to sit on the chair.

## 2025-01-19 NOTE — ASSESSMENT & PLAN NOTE
Patient with Hypoxic Respiratory failure which is Acute.  he is not on home oxygen. Supplemental oxygen was provided and noted-      .   Signs/symptoms of respiratory failure include- respiratory distress. Contributing diagnoses includes - CHF Labs and images were reviewed. Patient Has not had a recent ABG. Will treat underlying causes and adjust management of respiratory failure as follows- As above     Follow up repeat CXR  Increase Lasix dosage

## 2025-01-20 VITALS
BODY MASS INDEX: 23.89 KG/M2 | HEIGHT: 72 IN | DIASTOLIC BLOOD PRESSURE: 59 MMHG | TEMPERATURE: 98 F | RESPIRATION RATE: 20 BRPM | WEIGHT: 176.38 LBS | HEART RATE: 89 BPM | OXYGEN SATURATION: 93 % | SYSTOLIC BLOOD PRESSURE: 118 MMHG

## 2025-01-20 LAB
ANION GAP SERPL CALC-SCNC: 13 MMOL/L (ref 8–16)
APTT PPP: 53.7 SEC (ref 21–32)
BASOPHILS # BLD AUTO: 0.01 K/UL (ref 0–0.2)
BASOPHILS NFR BLD: 0.1 % (ref 0–1.9)
BUN SERPL-MCNC: 19 MG/DL (ref 8–23)
CALCIUM SERPL-MCNC: 8.9 MG/DL (ref 8.7–10.5)
CHLORIDE SERPL-SCNC: 92 MMOL/L (ref 95–110)
CO2 SERPL-SCNC: 23 MMOL/L (ref 23–29)
CREAT SERPL-MCNC: 0.8 MG/DL (ref 0.5–1.4)
DIFFERENTIAL METHOD BLD: ABNORMAL
EOSINOPHIL # BLD AUTO: 0 K/UL (ref 0–0.5)
EOSINOPHIL NFR BLD: 0 % (ref 0–8)
ERYTHROCYTE [DISTWIDTH] IN BLOOD BY AUTOMATED COUNT: 11.9 % (ref 11.5–14.5)
EST. GFR  (NO RACE VARIABLE): >60 ML/MIN/1.73 M^2
GLUCOSE SERPL-MCNC: 109 MG/DL (ref 70–110)
HCT VFR BLD AUTO: 37.9 % (ref 40–54)
HGB BLD-MCNC: 13.2 G/DL (ref 14–18)
IMM GRANULOCYTES # BLD AUTO: 0.05 K/UL (ref 0–0.04)
IMM GRANULOCYTES NFR BLD AUTO: 0.7 % (ref 0–0.5)
LYMPHOCYTES # BLD AUTO: 0.6 K/UL (ref 1–4.8)
LYMPHOCYTES NFR BLD: 8.9 % (ref 18–48)
MAGNESIUM SERPL-MCNC: 1.8 MG/DL (ref 1.6–2.6)
MCH RBC QN AUTO: 32.6 PG (ref 27–31)
MCHC RBC AUTO-ENTMCNC: 34.8 G/DL (ref 32–36)
MCV RBC AUTO: 94 FL (ref 82–98)
MONOCYTES # BLD AUTO: 0.6 K/UL (ref 0.3–1)
MONOCYTES NFR BLD: 8.9 % (ref 4–15)
NEUTROPHILS # BLD AUTO: 5.9 K/UL (ref 1.8–7.7)
NEUTROPHILS NFR BLD: 81.4 % (ref 38–73)
NRBC BLD-RTO: 0 /100 WBC
PLATELET # BLD AUTO: 205 K/UL (ref 150–450)
PMV BLD AUTO: 10.3 FL (ref 9.2–12.9)
POTASSIUM SERPL-SCNC: 3.9 MMOL/L (ref 3.5–5.1)
RBC # BLD AUTO: 4.05 M/UL (ref 4.6–6.2)
SODIUM SERPL-SCNC: 128 MMOL/L (ref 136–145)
WBC # BLD AUTO: 7.23 K/UL (ref 3.9–12.7)

## 2025-01-20 PROCEDURE — C1894 INTRO/SHEATH, NON-LASER: HCPCS | Performed by: INTERNAL MEDICINE

## 2025-01-20 PROCEDURE — 83735 ASSAY OF MAGNESIUM: CPT | Performed by: STUDENT IN AN ORGANIZED HEALTH CARE EDUCATION/TRAINING PROGRAM

## 2025-01-20 PROCEDURE — 99900035 HC TECH TIME PER 15 MIN (STAT)

## 2025-01-20 PROCEDURE — 25500020 PHARM REV CODE 255: Performed by: INTERNAL MEDICINE

## 2025-01-20 PROCEDURE — 93005 ELECTROCARDIOGRAM TRACING: CPT

## 2025-01-20 PROCEDURE — 25000003 PHARM REV CODE 250: Performed by: INTERNAL MEDICINE

## 2025-01-20 PROCEDURE — 85025 COMPLETE CBC W/AUTO DIFF WBC: CPT | Performed by: EMERGENCY MEDICINE

## 2025-01-20 PROCEDURE — 97110 THERAPEUTIC EXERCISES: CPT

## 2025-01-20 PROCEDURE — 4A023N7 MEASUREMENT OF CARDIAC SAMPLING AND PRESSURE, LEFT HEART, PERCUTANEOUS APPROACH: ICD-10-PCS | Performed by: INTERNAL MEDICINE

## 2025-01-20 PROCEDURE — 93010 ELECTROCARDIOGRAM REPORT: CPT | Mod: ,,, | Performed by: STUDENT IN AN ORGANIZED HEALTH CARE EDUCATION/TRAINING PROGRAM

## 2025-01-20 PROCEDURE — 99152 MOD SED SAME PHYS/QHP 5/>YRS: CPT | Mod: ,,, | Performed by: INTERNAL MEDICINE

## 2025-01-20 PROCEDURE — C1769 GUIDE WIRE: HCPCS | Performed by: INTERNAL MEDICINE

## 2025-01-20 PROCEDURE — 85730 THROMBOPLASTIN TIME PARTIAL: CPT | Performed by: EMERGENCY MEDICINE

## 2025-01-20 PROCEDURE — 99152 MOD SED SAME PHYS/QHP 5/>YRS: CPT | Performed by: INTERNAL MEDICINE

## 2025-01-20 PROCEDURE — 63600175 PHARM REV CODE 636 W HCPCS: Performed by: INTERNAL MEDICINE

## 2025-01-20 PROCEDURE — 94761 N-INVAS EAR/PLS OXIMETRY MLT: CPT

## 2025-01-20 PROCEDURE — B2111ZZ FLUOROSCOPY OF MULTIPLE CORONARY ARTERIES USING LOW OSMOLAR CONTRAST: ICD-10-PCS | Performed by: INTERNAL MEDICINE

## 2025-01-20 PROCEDURE — 93458 L HRT ARTERY/VENTRICLE ANGIO: CPT | Mod: 26,,, | Performed by: INTERNAL MEDICINE

## 2025-01-20 PROCEDURE — 36415 COLL VENOUS BLD VENIPUNCTURE: CPT | Performed by: EMERGENCY MEDICINE

## 2025-01-20 PROCEDURE — B2151ZZ FLUOROSCOPY OF LEFT HEART USING LOW OSMOLAR CONTRAST: ICD-10-PCS | Performed by: INTERNAL MEDICINE

## 2025-01-20 PROCEDURE — 93010 ELECTROCARDIOGRAM REPORT: CPT | Mod: 76,,, | Performed by: STUDENT IN AN ORGANIZED HEALTH CARE EDUCATION/TRAINING PROGRAM

## 2025-01-20 PROCEDURE — 94660 CPAP INITIATION&MGMT: CPT

## 2025-01-20 PROCEDURE — C1887 CATHETER, GUIDING: HCPCS | Performed by: INTERNAL MEDICINE

## 2025-01-20 PROCEDURE — 80048 BASIC METABOLIC PNL TOTAL CA: CPT | Performed by: STUDENT IN AN ORGANIZED HEALTH CARE EDUCATION/TRAINING PROGRAM

## 2025-01-20 PROCEDURE — 63600175 PHARM REV CODE 636 W HCPCS: Performed by: HOSPITALIST

## 2025-01-20 PROCEDURE — 94640 AIRWAY INHALATION TREATMENT: CPT

## 2025-01-20 PROCEDURE — 25000242 PHARM REV CODE 250 ALT 637 W/ HCPCS: Performed by: INTERNAL MEDICINE

## 2025-01-20 PROCEDURE — 27000221 HC OXYGEN, UP TO 24 HOURS

## 2025-01-20 PROCEDURE — 93458 L HRT ARTERY/VENTRICLE ANGIO: CPT | Performed by: INTERNAL MEDICINE

## 2025-01-20 PROCEDURE — 97116 GAIT TRAINING THERAPY: CPT

## 2025-01-20 RX ORDER — SPIRONOLACTONE 25 MG/1
25 TABLET ORAL DAILY
Qty: 30 TABLET | Refills: 11 | Status: SHIPPED | OUTPATIENT
Start: 2025-01-20 | End: 2026-01-20

## 2025-01-20 RX ORDER — METOPROLOL SUCCINATE 25 MG/1
25 TABLET, EXTENDED RELEASE ORAL DAILY
Status: DISCONTINUED | OUTPATIENT
Start: 2025-01-20 | End: 2025-01-20 | Stop reason: HOSPADM

## 2025-01-20 RX ORDER — IODIXANOL 320 MG/ML
INJECTION, SOLUTION INTRAVASCULAR
Status: DISCONTINUED | OUTPATIENT
Start: 2025-01-20 | End: 2025-01-20 | Stop reason: HOSPADM

## 2025-01-20 RX ORDER — FENTANYL CITRATE 50 UG/ML
INJECTION, SOLUTION INTRAMUSCULAR; INTRAVENOUS
Status: DISCONTINUED | OUTPATIENT
Start: 2025-01-20 | End: 2025-01-20 | Stop reason: HOSPADM

## 2025-01-20 RX ORDER — ATORVASTATIN CALCIUM 40 MG/1
40 TABLET, FILM COATED ORAL DAILY
Status: DISCONTINUED | OUTPATIENT
Start: 2025-01-21 | End: 2025-01-20 | Stop reason: HOSPADM

## 2025-01-20 RX ORDER — SPIRONOLACTONE 25 MG/1
25 TABLET ORAL DAILY
Status: DISCONTINUED | OUTPATIENT
Start: 2025-01-20 | End: 2025-01-20 | Stop reason: HOSPADM

## 2025-01-20 RX ORDER — LIDOCAINE HYDROCHLORIDE 10 MG/ML
INJECTION, SOLUTION EPIDURAL; INFILTRATION; INTRACAUDAL; PERINEURAL
Status: DISCONTINUED | OUTPATIENT
Start: 2025-01-20 | End: 2025-01-20 | Stop reason: HOSPADM

## 2025-01-20 RX ORDER — LOSARTAN POTASSIUM 25 MG/1
25 TABLET ORAL DAILY
Status: DISCONTINUED | OUTPATIENT
Start: 2025-01-20 | End: 2025-01-20 | Stop reason: HOSPADM

## 2025-01-20 RX ORDER — MIDAZOLAM HYDROCHLORIDE 1 MG/ML
INJECTION, SOLUTION INTRAMUSCULAR; INTRAVENOUS
Status: DISCONTINUED | OUTPATIENT
Start: 2025-01-20 | End: 2025-01-20 | Stop reason: HOSPADM

## 2025-01-20 RX ORDER — VERAPAMIL HYDROCHLORIDE 2.5 MG/ML
INJECTION, SOLUTION INTRAVENOUS
Status: DISCONTINUED | OUTPATIENT
Start: 2025-01-20 | End: 2025-01-20 | Stop reason: HOSPADM

## 2025-01-20 RX ORDER — HEPARIN SODIUM 1000 [USP'U]/ML
INJECTION, SOLUTION INTRAVENOUS; SUBCUTANEOUS
Status: DISCONTINUED | OUTPATIENT
Start: 2025-01-20 | End: 2025-01-20 | Stop reason: HOSPADM

## 2025-01-20 RX ORDER — FUROSEMIDE 20 MG/1
20 TABLET ORAL DAILY
Status: DISCONTINUED | OUTPATIENT
Start: 2025-01-21 | End: 2025-01-20 | Stop reason: HOSPADM

## 2025-01-20 RX ORDER — HEPARIN SODIUM 5000 [USP'U]/ML
5000 INJECTION, SOLUTION INTRAVENOUS; SUBCUTANEOUS EVERY 12 HOURS
Status: DISCONTINUED | OUTPATIENT
Start: 2025-01-20 | End: 2025-01-20 | Stop reason: HOSPADM

## 2025-01-20 RX ORDER — ATORVASTATIN CALCIUM 40 MG/1
40 TABLET, FILM COATED ORAL DAILY
Qty: 90 TABLET | Refills: 3 | Status: SHIPPED | OUTPATIENT
Start: 2025-01-21 | End: 2026-01-21

## 2025-01-20 RX ORDER — HEPARIN SODIUM 200 [USP'U]/100ML
INJECTION, SOLUTION INTRAVENOUS
Status: DISCONTINUED | OUTPATIENT
Start: 2025-01-20 | End: 2025-01-20 | Stop reason: HOSPADM

## 2025-01-20 RX ORDER — METOPROLOL SUCCINATE 25 MG/1
25 TABLET, EXTENDED RELEASE ORAL DAILY
Qty: 90 TABLET | Refills: 3 | Status: SHIPPED | OUTPATIENT
Start: 2025-01-20 | End: 2026-01-20

## 2025-01-20 RX ORDER — LOSARTAN POTASSIUM 25 MG/1
25 TABLET ORAL DAILY
Qty: 90 TABLET | Refills: 3 | Status: SHIPPED | OUTPATIENT
Start: 2025-01-20 | End: 2026-01-20

## 2025-01-20 RX ORDER — FUROSEMIDE 20 MG/1
20 TABLET ORAL DAILY
Qty: 30 TABLET | Refills: 11 | Status: SHIPPED | OUTPATIENT
Start: 2025-01-21 | End: 2026-01-21

## 2025-01-20 RX ORDER — SODIUM CHLORIDE 9 MG/ML
INJECTION, SOLUTION INTRAVENOUS CONTINUOUS
Status: ACTIVE | OUTPATIENT
Start: 2025-01-20 | End: 2025-01-20

## 2025-01-20 RX ORDER — ASPIRIN 81 MG/1
81 TABLET ORAL DAILY
Qty: 30 TABLET | Refills: 0 | Status: SHIPPED | OUTPATIENT
Start: 2025-01-21 | End: 2025-02-20

## 2025-01-20 RX ORDER — FUROSEMIDE 10 MG/ML
40 INJECTION INTRAMUSCULAR; INTRAVENOUS EVERY 12 HOURS
Status: DISCONTINUED | OUTPATIENT
Start: 2025-01-20 | End: 2025-01-20

## 2025-01-20 RX ADMIN — METOPROLOL TARTRATE 25 MG: 25 TABLET, FILM COATED ORAL at 08:01

## 2025-01-20 RX ADMIN — POLYETHYLENE GLYCOL (3350) 17 G: 17 POWDER, FOR SOLUTION ORAL at 08:01

## 2025-01-20 RX ADMIN — CLOPIDOGREL BISULFATE 75 MG: 75 TABLET ORAL at 08:01

## 2025-01-20 RX ADMIN — SODIUM CHLORIDE: 0.9 INJECTION, SOLUTION INTRAVENOUS at 11:01

## 2025-01-20 RX ADMIN — ASPIRIN 81 MG: 81 TABLET, COATED ORAL at 08:01

## 2025-01-20 RX ADMIN — SPIRONOLACTONE 25 MG: 25 TABLET ORAL at 02:01

## 2025-01-20 RX ADMIN — FUROSEMIDE 40 MG: 10 INJECTION, SOLUTION INTRAMUSCULAR; INTRAVENOUS at 08:01

## 2025-01-20 RX ADMIN — ATORVASTATIN CALCIUM 80 MG: 40 TABLET, FILM COATED ORAL at 08:01

## 2025-01-20 RX ADMIN — IPRATROPIUM BROMIDE AND ALBUTEROL SULFATE 3 ML: .5; 3 SOLUTION RESPIRATORY (INHALATION) at 08:01

## 2025-01-20 RX ADMIN — MUPIROCIN: 20 OINTMENT TOPICAL at 02:01

## 2025-01-20 RX ADMIN — IPRATROPIUM BROMIDE AND ALBUTEROL SULFATE 3 ML: .5; 3 SOLUTION RESPIRATORY (INHALATION) at 01:01

## 2025-01-20 NOTE — PLAN OF CARE
Remaining air removed from right radial vasc band. Gauze and tegaderm dressing applied c.d.i.   No drainage or shadowing noted. No bleeding or hematoma noted around site. +2 eliza radial pulses palpated. Skin normal in color, warm to touch, < 3 sec cap refill.   Pt tolerated well.  Will continue to monitor pt.

## 2025-01-20 NOTE — NURSING
Pt arrived to floor from cath lab. A&Ox4. Room air. Dressing to right wrist CDI. NS infusing at 50 ml/hr.

## 2025-01-20 NOTE — PT/OT/SLP PROGRESS
Occupational Therapy      Patient Name:  Miguel Mckee   MRN:  [id number]    Patient not seen today secondary to AM 1011: Off the floor for procedure/surgery. Pt in cath lab.    1/20/2025

## 2025-01-20 NOTE — NURSING
AVS printed and given to pt - VN reviewed with neighbor. PIV and tele discontinued. Medications delivered to bedside.

## 2025-01-20 NOTE — BRIEF OP NOTE
[facility] - Cath Lab ([facility])  Surgery Department  Operative Note    SUMMARY   POST CATH NOTE    Date of Procedure: 1/20/2025     Procedure: Procedure(s) (LRB):  Left heart cath (Left)  ANGIOGRAM, CORONARY ARTERY (N/A)     Surgeons and Role:     * Jorge Hernandez MD - Primary    Assisting Surgeon: None    Pre-Operative Diagnosis: NSTEMI (non-ST elevation myocardial infarction) [I21.4]  Acute decompensated heart failure [I50.9]    Post-Operative Diagnosis: Post-Op Diagnosis Codes:     * NSTEMI (non-ST elevation myocardial infarction) [I21.4]     * Acute decompensated heart failure [I50.9]    Description of Pre-Procedure(s): Prior to cardiac catheterization, the procedure was discussed at length with the patient including a comprehensive list of risks, benefits and alternatives.  Patient was informed of all possible complications and verbally acknowledged that they understood and the patient decided to proceed with cardiac catheterization.  Expressed written consent was confirmed in chart prior to beginning the procedure.  Myself along with the scrub tech and the nurse entered the room.  A formal timeout was performed in which patient was identified by name, date of birth, medical record number and procedure being performed; all agreed.  The patient was prepped and draped in the usual sterile fashion.  Conscious sedation was subsequently ordered and administered by a nurse specifically trained in conscious sedation. Please see attached procedure log for specifics regarding approach/equipment.   s/p catheterization secondary to:     Cath Results:  Access:  Right radial artery with 5-6 Italian sheath  LM:  VIJAY III flow 0% stenosis  LAD: VIJAY III flow mild luminal irregularity with 10 % stenosis prox/mid segment  LCx:  VIJAY III flow codominant with 0% stenosis  RCA: VIJAY III flow small-to-moderate caliber vessel codominant with 0% stenosis  Dominance codominant    LVgram: LVEDP 10 mm Hg, 40-45% with hypokinesia across  the apical walls    Intervention:  None  Closure device:  Vasc band  Patient tolerated procedure well, no complications    Post Cath Exam:  /66 (BP Location: Right arm, Patient Position: Lying)   Pulse 80   Temp 98.2 °F (36.8 °C) (Oral)   Resp 20   Ht 6' (1.829 m)   Wt 80 kg (176 lb 5.9 oz)   SpO2 95%   BMI 23.92 kg/m²     Anesthesia: RN IV Sedation      Estimated Blood Loss (EBL): * No values recorded between 1/20/2025 10:40 AM and 1/20/2025 10:57 AM *           Specimens:   Specimen (24h ago, onward)      None               Assessment:   Nonischemic cardiomyopathy/likely stress-induced cardiomyopathy versus viral  Very minimal coronary artery disease    Plan:   We will recommend medical therapy  Stop heparin drip  Stopped Plavix  Continue aspirin 81 mg daily  Continue atorvastatin 40 mg q.h.s.  Switch Lopressor to Toprol 25 mg daily  Start losartan 25 mg daily  Start spironolactone 25 mg daily  Switch Lasix to p.o. 20 mg daily  Needs follow up with Cardiology in 2-3 weeks with a repeat echocardiogram and BMP for up titration of his guideline directed medical therapy

## 2025-01-20 NOTE — PLAN OF CARE
Problem: Physical Therapy  Goal: Physical Therapy Goal  Description: Goals to be met by: 2025     Patient will increase functional independence with mobility by performin. Sit to stand transfer with McMullen  2. Gait  x 150 feet with Modified McMullen using No Assistive Device.   3. Ascend/descend 4 stair with right Handrails Modified McMullen using LRAD without SOB  4. Stand for 15 minutes with Modified McMullen using LRAD without SOB    Outcome: Progressing     Patient agreeable to PT treatment. Upon arrival to room, patient in supine position with head of bed elevated with oxygen not actively on patient. Pulse ox measured at 91-92% with patient educated in pursed lip breathing with patient able to return proper demonstration. Patient able to complete bed mobility with SBA, and sit to/from stand with CGA with SPC. Patient was able to ambulate with SPC with SBA x 125 feet with patient demonstrating wide MATT, bilateral toeing out, and increase in lateral weightshift with gait. Patient returned to seated at edge of bed position with patient pulse oxygen measuring 91-92% on room air. Patient able to complete seated bilateral LE exercises including ankle pumps, LAQs, and seated hip flexion for 1 set of 15 repetitions with education to ensure normal respiration. Patient returned to supine position with head of bed elevated and all lines in tact including oxygen with cardiologist present to discuss procedure.

## 2025-01-20 NOTE — PLAN OF CARE
Patient transferred to recovery cath lab slot 2 via stretcher with side rails up x2 .  Pt AAO X4 and able to follow commands. Pt is stable when connecting to cardiac monitors.  VSS. Right radial vasc band in place with 15ml of air in band c.d.i. no bleeding or hematoma noted. +2 eliza radial pulses palpated. +2 eliza pedal pulses.Skin normal in color and warm to touch, <3 sec cap refill.  Fall risk precautions given and patient acknowledges.  AIDET completed to pt.  Will continue to monitor patient.

## 2025-01-20 NOTE — PLAN OF CARE
Report given to heath Ibarra nurse.  All questions answered.  Pt sitting up in bed eating lunch.  VSS.  NAD noted.

## 2025-01-20 NOTE — PROGRESS NOTES
Discharge orders noted. Additional clinical references attached. Patient's discharge instructions given by bedside RN. Virtual nurse phoned into room and reviewed discharge instructions with patient's neighbor due to patient Augustine. Education provided on new medications, diagnosis, and follow-up appointments. Teach back method used. Patient's neighbor Alex verbalized understanding. All questions answered. Transport to [facility] requested. Bedside nurse updated on patient status and transportation request.      01/20/25 1538   AVS Confirmation   Discharge instructions and AVS provided to and reviewed with patient and/or significant other. Yes

## 2025-01-20 NOTE — PLAN OF CARE
1230  CM was informed by Dr Vargas that the pt is medically stable to dc home today & will need a cards hospfu appt in 1 weeks. Pt was admitted with NSTEMI, is being followed by cards and PT/OT, & is having an LHC done today.     Message sent to the schedulers requesting a hospfu appt with cards in 1 week. Pt will be notified of appt date & time. Information added to the pt's discharge paperwork.     Pt off the unit when CM rounded. Voicemail message left for the pt's daughter, Kayla Carrasquillo ([phone]), requesting a return call to discharge the pt's discharge plan. Awaiting response.       [facility] - Telemetry  Initial Discharge Assessment       Primary Care Provider: Renee Casey -    Admission Diagnosis: Shortness of breath [R06.02]  Acute coronary syndrome [I24.9]  STEMI (ST elevation myocardial infarction) [I21.3]    Admission Date: 1/17/2025  Expected Discharge Date: 1/20/2025    Consult: card & PT/OT    Payor: MEDICARE / Plan: MEDICARE PART A & B / Product Type: Government /     Extended Emergency Contact Information  Primary Emergency Contact: Kayla Carrasquillo  Home Phone: [phone]  Mobile Phone: [phone]  Relation: Daughter  Preferred language: English   needed? No  Secondary Emergency Contact: Brock Carrasquillo  Mobile Phone: [phone]  Relation: Relative    Discharge Plan A: Home  Discharge Plan B: Home Health    No Pharmacies Listed    Initial Assessment (most recent)       Adult Discharge Assessment - 01/20/25 1400          Discharge Assessment    Assessment Type Discharge Planning Assessment     Confirmed/corrected address, phone number and insurance Yes     Source of Information patient     Communicated BROOKE with patient/caregiver Yes     People in Home alone     Do you expect to return to your current living situation? Yes     Do you have help at home or someone to help you manage your care at home? No     Prior to hospitilization cognitive status: Alert/Oriented     Current  cognitive status: Alert/Oriented     Equipment Currently Used at Home cane, straight;rollator;shower chair;grab bar     Readmission within 30 days? No     Patient currently being followed by outpatient case management? No     Do you currently have service(s) that help you manage your care at home? No     Do you take prescription medications? Yes     Do you have prescription coverage? Yes     Do you have any problems affording any of your prescribed medications? No     Is the patient taking medications as prescribed? yes     Are you on dialysis? No     Do you take coumadin? No     Discharge Plan A Home     Discharge Plan B Home Health     DME Needed Upon Discharge  none     Discharge Plan discussed with: Patient (P)                    1400  Patient resting quietly in bed when CM rounded. No family present.     Patient lives alone, has equipment to assist wtih ADLs, & denied the need for assistance with transportation at time of discharge.     CM informed that he will be notified of  a hospital follow up appointment with Dr Hernandez (cards).    CM received confirmation from the pt's neighbor, Je ([phone]), that he is en route to provide transportation.     8086  Message sent to nurse Ibarra, charge nurse, Dina, & virtual nurse Crissy informing that the pt is cleared to discharge.       Will continue to follow.

## 2025-01-20 NOTE — PLAN OF CARE
Patient transferred to floor on tele monitor.  VSS. No c/o pain.  Patient attached to tele monitor upon arrival in room 474.  Right radial with gauze/tegaderm CDI. No bleeding or hematoma noted. Site reviewed with heath, RN at bedside.  All questions answered. Patient attached back to O2 at 3LPM.  Patient handed personal cell phone.

## 2025-01-20 NOTE — PLAN OF CARE
[facility] - Telemetry  Discharge Final Note    Primary Care Provider: Renee Casey -    Expected Discharge Date: 1/20/2025    Final Discharge Note (most recent)       Final Note - 01/20/25 1701          Final Note    Assessment Type Final Discharge Note (P)      Anticipated Discharge Disposition Home or Self Care (P)      Hospital Resources/Appts/Education Provided Appointments scheduled and added to AVS (P)    pt will be notified of a cardiology hospital follow up appointment                    Contact Info       Jorge Hernandez MD   Specialty: Cardiology, Interventional Cardiology    [address]  [address]  [place] [address]   Phone: [phone]       Next Steps: Follow up    Instructions: Patient will be notified of a cardiology hospital follow up appointment.

## 2025-01-20 NOTE — PROGRESS NOTES
[facility] Cath Lab ([facility])  [facility] Medicine  Progress Note    Patient Name: Miguel Mckee  MRN: [id number]  Patient Class: IP- Inpatient   Admission Date: 1/17/2025  Length of Stay: 3 days  Attending Physician: Quinton Vargas MD  Primary Care Provider: Renee Casey -        Subjective     Principal Problem:NSTEMI (non-ST elevation myocardial infarction)  Acute Condition: stable        HPI:  Patient very pleasant 78 y/o M with hx HTN, no CAD, or lung disease prior, presents in [facility], due to resp distress , transferred from [facility] hospital to here for further eval. Patient endorsed chest pain substernal , associated with progressive acute dyspnea, orthopnea and increased work of breathing . Responded to supplemental oxygen, BIPAP and Lasix     Overview/Hospital Course:  No notes on file    Interval History:     NAEON  No cp/sob  No f/c/n/v  No complaints at this time    Review of Systems  Objective:     Vital Signs (Most Recent):  Temp: 98.2 °F (36.8 °C) (01/20/25 0754)  Pulse: 80 (01/20/25 0857)  Resp: 20 (01/20/25 0857)  BP: 127/66 (01/20/25 0754)  SpO2: 95 % (01/20/25 0857) Vital Signs (24h Range):  Temp:  [97.3 °F (36.3 °C)-98.6 °F (37 °C)] 98.2 °F (36.8 °C)  Pulse:  [64-91] 80  Resp:  [16-20] 20  SpO2:  [92 %-98 %] 95 %  BP: (108-127)/(57-68) 127/66     Weight: 80 kg (176 lb 5.9 oz)  Body mass index is 23.92 kg/m².    Intake/Output Summary (Last 24 hours) at 1/20/2025 1110  Last data filed at 1/20/2025 0857  Gross per 24 hour   Intake 252.84 ml   Output 720 ml   Net -467.16 ml         Physical Exam  Constitutional:       General: He is not in acute distress.     Appearance: He is not ill-appearing or toxic-appearing.   HENT:      Ears:      Comments: Hard of hearing     Mouth/Throat:      Mouth: Mucous membranes are moist.      Pharynx: Oropharynx is clear.   Eyes:      Extraocular Movements: Extraocular movements intact.      Conjunctiva/sclera: Conjunctivae normal.   Cardiovascular:       Rate and Rhythm: Normal rate and regular rhythm.   Pulmonary:      Effort: Pulmonary effort is normal. No respiratory distress.      Breath sounds: Rhonchi present.   Abdominal:      General: There is no distension.      Tenderness: There is no abdominal tenderness. There is no guarding.   Musculoskeletal:         General: Normal range of motion.      Right lower leg: No edema.      Left lower leg: No edema.   Skin:     General: Skin is warm and dry.   Neurological:      General: No focal deficit present.      Mental Status: He is alert and oriented to person, place, and time.   Psychiatric:         Mood and Affect: Mood normal.         Behavior: Behavior normal.             Significant Labs: All pertinent labs within the past 24 hours have been reviewed.    Significant Imaging: I have reviewed all pertinent imaging results/findings within the past 24 hours.    Assessment and Plan     * NSTEMI (non-ST elevation myocardial infarction)  No chest pain or dyspnea.   Currently HDS    Dx:  Troponin peaked at 0.981  [facility] wall motion abnormalities present, estimated ejection fraction of 30 - 35   Plan for cath    Tx:  GDMT as tolerated for HR, BP ,   S/p heparin gtt  DAPT   Cardiology consult      Acute decompensated heart failure  See: NSTEMI      Shortness of breath        Acute coronary syndrome  Troponin peaked 0.891      Hyponatremia  Hyponatremia is likely due to Heart Failure. The patient's most recent sodium results are listed below.  Recent Labs     01/18/25  1119 01/19/25  0603 01/20/25  0553   * 129* 128*       Plan  - Correct the sodium by 4-6mEq in 24 hours.   - Obtain the following studies: TSH, T4.  - Will treat the hyponatremia with Fluid restriction of:  1.5 liter per day, Removal of offending medications, and Lasix 80 mg IV BID   - Monitor sodium Daily.         Primary hypertension  Patient's blood pressure range in the last 24 hours was: BP  Min: 108/61  Max: 127/66.The patient's  inpatient anti-hypertensive regimen is listed below:  Current Antihypertensives  nitroGLYCERIN SL tablet 0.4 mg, Every 5 min PRN, Sublingual  verapamiL injection, As needed (PRN),   nitroGLYCERIN in D5W 200 mcg/mL vial, As needed (PRN),   losartan tablet 25 mg, Daily, Oral  spironolactone tablet 25 mg, Daily, Oral  furosemide tablet 20 mg, Daily, Oral  metoprolol succinate (TOPROL-XL) 24 hr tablet 25 mg, Daily, Oral    Plan  - BP is controlled, no changes needed to their regimen  -     Acute hypoxic respiratory failure  Patient with Hypoxic Respiratory failure which is Acute.  he is not on home oxygen. Supplemental oxygen was provided and noted-      .   Signs/symptoms of respiratory failure include- respiratory distress. Contributing diagnoses includes - CHF Labs and images were reviewed. Patient Has not had a recent ABG. Will treat underlying causes and adjust management of respiratory failure as follows- As above     Follow up repeat CXR - interstitial opacities in both lungs   Symptoms improved with diuresis      VTE Risk Mitigation (From admission, onward)           Ordered     heparin (porcine) injection 5,000 Units  Every 12 hours         01/20/25 1109     heparin (porcine) injection  As needed (PRN)         01/20/25 1053     heparin infusion 1,000 units/500 ml in 0.9% NaCl (pressure line flush)  Intra-op continuous PRN         01/20/25 1032     IP VTE HIGH RISK PATIENT  Once         01/17/25 1817     Place sequential compression device  Until discontinued         01/17/25 1817                    Discharge Planning   BROOKE: 1/19/2025     Code Status: Full Code   Medical Readiness for Discharge Date:                    Please place Justification for DME        Quinton Vargas MD  Department of Hospital Medicine   [facility] ([facility])

## 2025-01-20 NOTE — PLAN OF CARE
2 mL of air removed from radial vasc band.  No hematoma or bleeding noted.  +2 eliza radial pulses palpated. Skin normal in color, warm to touch, < 3 sec cap refill.   Will continue to monitor pt.

## 2025-01-20 NOTE — ASSESSMENT & PLAN NOTE
[facility] Dermatology Note  Encounter Date: Feb 23, 2022  Office Visit     Dermatology Problem List:  1. Hair loss c/w FFA, started 2020  - Current tx: Rogaine, ketoconazole shampoo and Krysten deep conditioner two times per week, Lidex 3x weekly, protopic on eyebrows daily, calcium, multivitamin, latisse to eyebrows, topical minoxidil daily, fluocinolone oil  - ILK 5/18/2021, 6/30/2021, 9/29/2021, 11/24/21  - HairMetrix: 6/30/2021, 9/29/2021, 2/23/22  ____________________________________________    Assessment & Plan:    # Hair loss, most consistent with FFA and AGA. Patient has history of CREST and is currently on plaquenil. Patient is improving. She has a very small area with perifollicular erythema at the 's peak. Increased scale on hairmetrix and clinical exam.   - Increase lidex to daily for area of active disease for 2 weeks then back to 3x/week.   - Continue minoxidil 5% foam or solution to the vertex and occipital scalp daily. No changes today.   - Continue protopic on the eyebrows daily. No changes today. IF itchy use this  - Continue ketoconazole    - Continue latisse to the brows daily. No changes today. Skin is normal     # Mild Seborrheic Dermatitis - improved  - Continue ketoconazole shampoo 2 times weekly. No changes today.     Procedures Performed:   None    Follow-up: 3 month(s) in-person, or earlier for new or changing lesions    Staff and Scribe:     Scribe Disclosure:  IHenry, am serving as a scribe to document services personally performed by Pat Méndez MD based on data collection and the provider's statements to me.     I, Kisha Aggarwal, am serving as a scribe to document services personally performed by Pat Méndez MD based on data collection and the provider's statements to me.       Provider Disclosure:   The documentation recorded by the scribe accurately reflects the services I personally performed and the decisions made by me.    Pat  See: NSTEMI     MD Dev    Department of Dermatology  [facility]: Phone: [phone], Fax:[phone]  [facility]: Phone: [phone], Fax: [phone]      ____________________________________________    CC: Hair Loss    HPI:  Ms. Jeannine Rod is a(n) 78 year old female who presents today as a return patient for follow-up. Last seen by me on 2021, at which time patient underwent ILK for treatment of hair loss. Patient noticies increased hair growth, switched from foam to solution. Hair wont hold a curl, might be lack of humidity and decreased hair strength.   - Shedding or thinning, or both: thinning   - Current tx: lidex 3x per week, ketoconazole 2x per week, latisse and protopic to brows, topical minoxidil, ilk injections   - If using Rogaine, 1 cannister lasts how lon month   - Scalp or hair care habits/products: ketoconazole and milton deep conditioner     Patient notes itchy eyebrows and requests refill on the Latisse.     No Any new medications, supplements, or products? (please list below)     No Scalp pain   No Scalp burning   No Scalp itching    Yes increased  Eyebrow changes    No  Eyelash changes   No Beard changes    No Other body hair changes    No Nail changes    No Additional symptoms? (please list below)     - Overall course: improved   - COVID vaccine: yes, Pfizer. Second dose in 2021.    Patient is otherwise feeling well, in usual state of health, and has no additional skin concerns today.     Labs:  TSH: 20 wnl  Cbc: 21 wnl (outside provider)  Ferritin: 21 wnl  Zinc: 2021 wnl  Vitamin D: has never been checked. Patient previously declined checking vitamin D due to cost.     Physical Exam:  Vitals: LMP 1992   GEN: Well developed, well-nourished, in no acute distress, in a pleasant mood.    SKIN: Focused examination of scalp, face, nails  was performed.  - Rizvi type: II  - Bernardo part width of 2 anteriorly 2-3 posteriorly  - The layers of hair regrowth layers were noted to be  - 1-2 cm for the first  - Veins prominent  - Mild erythema at 's peak  - No erythema of brows  - eyebrows intact  - eyelashes intact  - No other lesions of concern on areas examined.       Medications:  Current Outpatient Medications   Medication     amLODIPine (NORVASC) 5 MG tablet     atorvastatin (LIPITOR) 20 MG tablet     bimatoprost (LATISSE) 0.03 % external opthalmic solution     calcium-vitamin D (CALTRATE) 600-400 MG-UNIT per tablet     estradiol (ESTRACE) 0.1 MG/GM vaginal cream     fluocinonide (LIDEX) 0.05 % external solution     hydroxychloroquine (PLAQUENIL) 200 MG tablet     ketoconazole (NIZORAL) 2 % external shampoo     Melatonin ER 1 MG TBCR     Multiple Vitamin (MULTI-VITAMIN) per tablet     nystatin (MYCOSTATIN) 666149 UNIT/GM external cream     tacrolimus (PROTOPIC) 0.1 % external ointment     triamterene-HCTZ (MAXZIDE-25) 37.5-25 MG tablet     Current Facility-Administered Medications   Medication     triamcinolone acetonide (KENALOG-10) injection 10 mg     triamcinolone acetonide (KENALOG-10) injection 10 mg      Past Medical History:   Patient Active Problem List   Diagnosis     Malignant neoplasm of breast (H)     Raynaud's syndrome     Status post hip replacement     Pulmonary embolism and infarction     Systemic sclerosis (H)     Gastroesophageal reflux disease with esophagitis     Hyperlipidemia LDL goal <100     Calculus of gallbladder without cholecystitis     Radiculoplexus neuropathy     Benign essential hypertension     Atrophic vaginitis     Chronic kidney disease, stage 3 (H)     Past Medical History:   Diagnosis Date     Arthritis      Benign essential hypertension 10/23/2017     Benign essential hypertension 10/23/2017     Calculus of gallbladder without cholecystitis 10/6/2016     Gastroesophageal reflux disease with esophagitis  10/6/2016     Hyperlipidemia      Malignant neoplasm (H)     breast bilat mastectomy, no rad, no chemobilat      Pulmonary embolism and infarction 3/9/2012    IMO update changed this record. Please review for accuracy     Radiculoplexus neuropathy 10/6/2016     Raynaud's syndrome      Systemic sclerosis (H) 10/6/2016        CC Bebeto Freitas MD  [address] [address] on close of this encounter.

## 2025-01-20 NOTE — PROGRESS NOTES
01/20/25 1242   Nurse Notification   Charge Nurse Notified? Yes   Name of Charge Nurse Dina Kaur RN   Bedside Nurse Notified? Yes   Name of Bedside Nurse Stacey Clancy RN   Nurse Notfication Method Secure Chat   Nurse Notified Of Other  (Discharge orders received. AVS prepared and okay to print for discharge review once cleared by CM.)    Notification    Notified? Yes   Name of  Olive Rocha RN    Notification Method Secure Chat    Notified Of Discharge Status   Type of Frequent Check   Type Patient Rounds  (Pt. out of room)

## 2025-01-20 NOTE — ASSESSMENT & PLAN NOTE
Patient with Hypoxic Respiratory failure which is Acute.  he is not on home oxygen. Supplemental oxygen was provided and noted-      .   Signs/symptoms of respiratory failure include- respiratory distress. Contributing diagnoses includes - CHF Labs and images were reviewed. Patient Has not had a recent ABG. Will treat underlying causes and adjust management of respiratory failure as follows- As above     Follow up repeat CXR - interstitial opacities in both lungs   Symptoms improved with diuresis

## 2025-01-20 NOTE — ASSESSMENT & PLAN NOTE
Patient's blood pressure range in the last 24 hours was: BP  Min: 108/61  Max: 127/66.The patient's inpatient anti-hypertensive regimen is listed below:  Current Antihypertensives  nitroGLYCERIN SL tablet 0.4 mg, Every 5 min PRN, Sublingual  verapamiL injection, As needed (PRN),   nitroGLYCERIN in D5W 200 mcg/mL vial, As needed (PRN),   losartan tablet 25 mg, Daily, Oral  spironolactone tablet 25 mg, Daily, Oral  furosemide tablet 20 mg, Daily, Oral  metoprolol succinate (TOPROL-XL) 24 hr tablet 25 mg, Daily, Oral    Plan  - BP is controlled, no changes needed to their regimen  -

## 2025-01-20 NOTE — PLAN OF CARE
Problem: Adult Inpatient Plan of Care  Goal: Plan of Care Review  Outcome: Progressing  Goal: Absence of Hospital-Acquired Illness or Injury  Outcome: Progressing  Goal: Readiness for Transition of Care  Outcome: Progressing     Problem: Acute Coronary Syndrome  Goal: Absence of Cardiac-Related Pain  Outcome: Progressing  Goal: Effective Cardiac Pump Function  Outcome: Progressing     Problem: Cardiac Catheterization (Diagnostic/Interventional)  Goal: Absence of Bleeding  Outcome: Progressing

## 2025-01-20 NOTE — ASSESSMENT & PLAN NOTE
Hyponatremia is likely due to Heart Failure. The patient's most recent sodium results are listed below.  Recent Labs     01/18/25  1119 01/19/25  0603 01/20/25  0553   * 129* 128*       Plan  - Correct the sodium by 4-6mEq in 24 hours.   - Obtain the following studies: TSH, T4.  - Will treat the hyponatremia with Fluid restriction of:  1.5 liter per day, Removal of offending medications, and Lasix 80 mg IV BID   - Monitor sodium Daily.

## 2025-01-20 NOTE — PT/OT/SLP PROGRESS
Physical Therapy Treatment    Patient Name:  Miguel Mckee   MRN:  [id number]    Recommendations:     Discharge Recommendations: Low Intensity Therapy  Discharge Equipment Recommendations: other (see comments) (may need SPC)  Barriers to discharge: Decreased caregiver support    Assessment:     Miguel Mckee is a 79 y.o. male admitted with a medical diagnosis of NSTEMI (non-ST elevation myocardial infarction).  He presents with the following impairments/functional limitations: weakness, impaired endurance, impaired self care skills, impaired functional mobility, impaired balance, gait instability, decreased safety awareness.    Patient agreeable to PT treatment. Upon arrival to room, patient in supine position with head of bed elevated with oxygen not actively on patient. Pulse ox measured at 91-92% with patient educated in pursed lip breathing with patient able to return proper demonstration. Patient able to complete bed mobility with SBA, and sit to/from stand with CGA with SPC. Patient was able to ambulate with SPC with SBA x 125 feet with patient demonstrating wide MATT, bilateral toeing out, and increase in lateral weightshift with gait. Patient returned to seated at edge of bed position with patient pulse oxygen measuring 91-92% on room air. Patient able to complete seated bilateral LE exercises including ankle pumps, LAQs, and seated hip flexion for 1 set of 15 repetitions with education to ensure normal respiration. Patient returned to supine position with head of bed elevated and all lines in tact including oxygen with cardiologist present to discuss procedure.     Rehab Prognosis: Fair; patient would benefit from acute skilled PT services to address these deficits and reach maximum level of function.    Recent Surgery: Procedure(s) (LRB):  Left heart cath (Left)      Plan:     During this hospitalization, patient to be seen 4 x/week to address the identified rehab impairments via gait training, therapeutic  "activities, therapeutic exercises, neuromuscular re-education and progress toward the following goals:    Plan of Care Expires:  02/16/25    Subjective     Chief Complaint: "I am hungry."  Patient/Family Comments/goals: I am hungry so I would like to eat some food.   Pain/Comfort:  Pain Rating 1: 0/10      Objective:     Communicated with KEREN Ibarra prior to session.  Patient found HOB elevated with oxygen, peripheral IV, telemetry upon PT entry to room.     General Precautions: Standard, fall, hearing impaired  Orthopedic Precautions: N/A  Braces: N/A  Respiratory Status: Nasal cannula, flow 2 L/min     Functional Mobility:  Bed Mobility:     Supine to Sit: stand by assistance  Sit to Supine: stand by assistance  Transfers:  Sit to Stand:  contact guard assistance with straight cane  Gait: patient was able to ambulate 125 feet with SPC with CGA with patient demonstrating decreased gissel, decreased step length, wide MATT, bilateral toeing out, and increased lateral weightshift with gait  Balance: good seated balance, fair standing balance     AM-PAC 6 CLICK MOBILITY  Turning over in bed (including adjusting bedclothes, sheets and blankets)?: 4  Sitting down on and standing up from a chair with arms (e.g., wheelchair, bedside commode, etc.): 3  Moving from lying on back to sitting on the side of the bed?: 4  Moving to and from a bed to a chair (including a wheelchair)?: 3  Need to walk in hospital room?: 3  Climbing 3-5 steps with a railing?: 2  Basic Mobility Total Score: 19     Treatment & Education:  - PT reviewed PT role and POC.   - PT reviewed patient may benefit from use of SPC with transfers and gait to improve safety with patient demonstrating understanding.   - Patient able to complete seated therex including ankle pumps, LAQs, and hip flexion for 1 set of 15 repetitions with patient to complete daily.   - PT reviewed pursed lip breathing for patient to complete when experiencing bouts of SOB with patient " demonstrating understanding.   - PT reviewed postural awareness to ensure upright trunk and midline posture.     Patient left HOB elevated with all lines intact, call button in reach, and bed alarm on..    GOALS:   Multidisciplinary Problems       Physical Therapy Goals          Problem: Physical Therapy    Goal Priority Disciplines Outcome Interventions   Physical Therapy Goal     PT, PT/OT Progressing    Description: Goals to be met by: 2025     Patient will increase functional independence with mobility by performin. Sit to stand transfer with Tutor Key  2. Gait  x 150 feet with Modified Tutor Key using No Assistive Device.   3. Ascend/descend 4 stair with right Handrails Modified Tutor Key using LRAD without SOB  4. Stand for 15 minutes with Modified Tutor Key using LRAD without SOB                         Time Tracking:     PT Received On: 25  PT Start Time: 0803     PT Stop Time: 0830  PT Total Time (min): 27 min     Billable Minutes: Gait Training 15 minutes and Therapeutic Exercise 12 minutes    Treatment Type: Treatment  PT/PTA: PT     Number of PTA visits since last PT visit: 0     2025

## 2025-01-20 NOTE — ASSESSMENT & PLAN NOTE
No chest pain or dyspnea.   Currently HDS    Dx:  Troponin peaked at 0.981  Echo Regional wall motion abnormalities present, estimated ejection fraction of 30 - 35   Plan for cath    Tx:  GDMT as tolerated for HR, BP ,   S/p heparin gtt  DAPT   Cardiology consult

## 2025-01-20 NOTE — SUBJECTIVE & OBJECTIVE
Interval History:     NAEON  No cp/sob  No f/c/n/v  No complaints at this time    Review of Systems  Objective:     Vital Signs (Most Recent):  Temp: 98.2 °F (36.8 °C) (01/20/25 0754)  Pulse: 80 (01/20/25 0857)  Resp: 20 (01/20/25 0857)  BP: 127/66 (01/20/25 0754)  SpO2: 95 % (01/20/25 0857) Vital Signs (24h Range):  Temp:  [97.3 °F (36.3 °C)-98.6 °F (37 °C)] 98.2 °F (36.8 °C)  Pulse:  [64-91] 80  Resp:  [16-20] 20  SpO2:  [92 %-98 %] 95 %  BP: (108-127)/(57-68) 127/66     Weight: 80 kg (176 lb 5.9 oz)  Body mass index is 23.92 kg/m².    Intake/Output Summary (Last 24 hours) at 1/20/2025 1110  Last data filed at 1/20/2025 0857  Gross per 24 hour   Intake 252.84 ml   Output 720 ml   Net -467.16 ml         Physical Exam  Constitutional:       General: He is not in acute distress.     Appearance: He is not ill-appearing or toxic-appearing.   HENT:      Ears:      Comments: Hard of hearing     Mouth/Throat:      Mouth: Mucous membranes are moist.      Pharynx: Oropharynx is clear.   Eyes:      Extraocular Movements: Extraocular movements intact.      Conjunctiva/sclera: Conjunctivae normal.   Cardiovascular:      Rate and Rhythm: Normal rate and regular rhythm.   Pulmonary:      Effort: Pulmonary effort is normal. No respiratory distress.      Breath sounds: Rhonchi present.   Abdominal:      General: There is no distension.      Tenderness: There is no abdominal tenderness. There is no guarding.   Musculoskeletal:         General: Normal range of motion.      Right lower leg: No edema.      Left lower leg: No edema.   Skin:     General: Skin is warm and dry.   Neurological:      General: No focal deficit present.      Mental Status: He is alert and oriented to person, place, and time.   Psychiatric:         Mood and Affect: Mood normal.         Behavior: Behavior normal.             Significant Labs: All pertinent labs within the past 24 hours have been reviewed.    Significant Imaging: I have reviewed all pertinent  imaging results/findings within the past 24 hours.

## 2025-01-21 LAB
OHS QRS DURATION: 114 MS
OHS QRS DURATION: 116 MS
OHS QRS DURATION: 116 MS
OHS QRS DURATION: 120 MS
OHS QTC CALCULATION: 473 MS
OHS QTC CALCULATION: 477 MS
OHS QTC CALCULATION: 497 MS
OHS QTC CALCULATION: 502 MS

## 2025-01-21 NOTE — DISCHARGE SUMMARY
[facility] Medicine  Discharge Summary      Patient Name: Miguel Mckee  MRN: [id number]  KARINE: [id number]  Patient Class: IP- Inpatient  Admission Date: 1/17/2025  Hospital Length of Stay: 3 days  Discharge Date and Time: 1/20/2025  3:56 PM  Attending Physician: No att. providers found   Discharging Provider: Quinton Vargas MD  Primary Care Provider: [facility] -    Primary Care Team: Networked reference to record PCT     HPI:   Patient very pleasant 78 y/o M with hx HTN, no CAD, or lung disease prior, presents in [facility], due to resp distress , transferred [facility] to here for further eval. Patient endorsed chest pain substernal , associated with progressive acute dyspnea, orthopnea and increased work of breathing . Responded to supplemental oxygen, BIPAP and Lasix     Procedure(s) (LRB):  Left heart cath (Left)  ANGIOGRAM, CORONARY ARTERY (N/A)      Hospital Course:   79-year-old male with a history of hypertension, presenting with acute respiratory distress and substernal chest pain,  Initial management included oxygen, BiPAP, and diuresis with Lasix, leading to symptom improvement. Workup revealed an NSTEMI with troponin peaking at 0.981, EF of 30-35%, and regional wall motion abnormalities on echocardiogram. Coronary angiography showed no significant stenosis, with VIJAY III flow in all major arteries.  The patient experienced hypoxia which improved following diuresis and treatment of CHF. After cardiac catheterization the patient was discharged in stable condition with ongoing GDMT, and cardiology follow-up.     Goals of Care Treatment Preferences:  Code Status: Full Code      SDOH Screening:  The patient was screened for utility difficulties, food insecurity, transport difficulties, housing insecurity, and interpersonal safety and there were no concerns identified this admission.     Consults:   Consults (From admission, onward)          Status Ordering  Provider     Inpatient consult to Registered Dietitian/Nutritionist  Once        Provider:  (Not yet assigned)    Completed GINETTE GILBERT     Inpatient consult to Cardiology-[facility]  Once        Provider:  Jorge Hernandez MD    Completed GINETTE GILBERT            * NSTEMI (non-ST elevation myocardial infarction)  No chest pain or dyspnea.   Currently HDS    Dx:  Troponin peaked at 0.981  Echo Regional wall motion abnormalities present, estimated ejection fraction of 30 - 35   Plan for cath    Tx:  GDMT as tolerated for HR, BP ,   S/p heparin gtt  DAPT   Cardiology consult      Acute decompensated heart failure  See: NSTEMI      Shortness of breath        Acute coronary syndrome  Troponin peaked 0.891      Hyponatremia  Hyponatremia is likely due to Heart Failure. The patient's most recent sodium results are listed below.  Recent Labs     01/18/25  1119 01/19/25  0603 01/20/25  0553   * 129* 128*       Plan  - Correct the sodium by 4-6mEq in 24 hours.   - Obtain the following studies: TSH, T4.  - Will treat the hyponatremia with Fluid restriction of:  1.5 liter per day, Removal of offending medications, and Lasix 80 mg IV BID   - Monitor sodium Daily.         Primary hypertension  Patient's blood pressure range in the last 24 hours was: BP  Min: 111/57  Max: 127/66.The patient's inpatient anti-hypertensive regimen is listed below:  Current Antihypertensives  furosemide (LASIX) tablet, Daily, Oral  losartan (COZAAR) tablet, Daily, Oral  metoprolol succinate (TOPROL-XL) 24 hr tablet, Daily, Oral  spironolactone (ALDACTONE) tablet, Daily, Oral    Plan  - BP is controlled, no changes needed to their regimen  -     Acute hypoxic respiratory failure  Patient with Hypoxic Respiratory failure which is Acute.  he is not on home oxygen. Supplemental oxygen was provided and noted-      .   Signs/symptoms of respiratory failure include- respiratory distress. Contributing diagnoses includes - CHF Labs and images  were reviewed. Patient Has not had a recent ABG. Will treat underlying causes and adjust management of respiratory failure as follows- As above     Follow up repeat CXR - interstitial opacities in both lungs   Symptoms improved with diuresis      Final Active Diagnoses:    Diagnosis Date Noted POA    PRINCIPAL PROBLEM:  NSTEMI (non-ST elevation myocardial infarction) [I21.4] 01/17/2025 Yes    Acute coronary syndrome [I24.9] 01/18/2025 Yes    Shortness of breath [R06.02] 01/18/2025 Yes    Acute decompensated heart failure [I50.9] 01/18/2025 Yes    Acute hypoxic respiratory failure [J96.01] 01/17/2025 Yes    Primary hypertension [I10] 01/17/2025 Yes    Hyponatremia [E87.1] 01/17/2025 Yes      Problems Resolved During this Admission:       Discharged Condition: good    Disposition: Home or Self Care    Follow Up:   Follow-up Information       Jorge Hernandez MD Follow up.    Specialties: Cardiology, Interventional Cardiology  Why: Patient will be notified of a cardiology hospital follow up appointment.  Contact information:  [address]  [address]  [facility] [place] [address] [phone]                           Patient Instructions:      BASIC METABOLIC PANEL   Standing Status: Future Standing Exp. Date: 04/20/26       Significant Diagnostic Studies: Labs: All labs within the past 24 hours have been reviewed    Pending Diagnostic Studies:       None           Medications:  Reconciled Home Medications:      Medication List        START taking these medications      aspirin 81 MG EC tablet  Commonly known as: ECOTRIN  Take 1 tablet (81 mg total) by mouth once daily.  Start taking on: January 21, 2025     atorvastatin 40 MG tablet  Commonly known as: LIPITOR  Take 1 tablet (40 mg total) by mouth once daily.  Start taking on: January 21, 2025     furosemide 20 MG tablet  Commonly known as: LASIX  Take 1 tablet (20 mg total) by mouth once daily.  Start taking on: January 21, 2025     losartan 25 MG tablet  Commonly known  as: COZAAR  Take 1 tablet (25 mg total) by mouth once daily.     metoprolol succinate 25 MG 24 hr tablet  Commonly known as: TOPROL-XL  Take 1 tablet (25 mg total) by mouth once daily.     spironolactone 25 MG tablet  Commonly known as: ALDACTONE  Take 1 tablet (25 mg total) by mouth once daily.              Indwelling Lines/Drains at time of discharge:   Lines/Drains/Airways       None                   Time spent on the discharge of patient: 30 minutes         Quinton Vargas MD  Department of [facility]  [facility]

## 2025-01-21 NOTE — HOSPITAL COURSE
79-year-old male with a history of hypertension, presenting with acute respiratory distress and substernal chest pain,  Initial management included oxygen, BiPAP, and diuresis with Lasix, leading to symptom improvement. Workup revealed an NSTEMI with troponin peaking at 0.981, EF of 30-35%, and regional wall motion abnormalities on echocardiogram. Coronary angiography showed no significant stenosis, with VIJAY III flow in all major arteries.  The patient experienced hypoxia which improved following diuresis and treatment of CHF. After cardiac catheterization the patient was discharged in stable condition with ongoing GDMT, and cardiology follow-up.

## 2025-01-21 NOTE — ASSESSMENT & PLAN NOTE
Patient's blood pressure range in the last 24 hours was: BP  Min: 111/57  Max: 127/66.The patient's inpatient anti-hypertensive regimen is listed below:  Current Antihypertensives  furosemide (LASIX) tablet, Daily, Oral  losartan (COZAAR) tablet, Daily, Oral  metoprolol succinate (TOPROL-XL) 24 hr tablet, Daily, Oral  spironolactone (ALDACTONE) tablet, Daily, Oral    Plan  - BP is controlled, no changes needed to their regimen  -

## 2025-01-22 LAB
CATH EF ESTIMATED: 45 %
OHS QRS DURATION: 116 MS
OHS QRS DURATION: 118 MS
OHS QTC CALCULATION: 483 MS
OHS QTC CALCULATION: 488 MS

## 2025-01-23 LAB
OHS QRS DURATION: 108 MS
OHS QTC CALCULATION: 484 MS

## 2025-01-29 NOTE — PHYSICIAN QUERY
Provider, due to conflicting documentation please clarify the acuity and type of the patients heart failure.   Acute on chronic systolic heart failure (HFrEF or HFmrEF)

## 2025-02-25 PROBLEM — I50.20 HFREF (HEART FAILURE WITH REDUCED EJECTION FRACTION): Chronic | Status: ACTIVE | Noted: 2025-02-25

## 2025-03-07 ENCOUNTER — TELEPHONE (OUTPATIENT)
Dept: CARDIOLOGY | Facility: CLINIC | Age: 80
End: 2025-03-07
Payer: MEDICARE

## 2025-03-07 NOTE — TELEPHONE ENCOUNTER
Reached out to patient this morning due to he had an appointment with Aneudy Noland N.p   on 2-25-25 Post Procedure , Pt No Showed for that appointment.  Today I left him V/Message reminding him to call us back to schedule an appointment or go on your Patient portal and schedule at your  Convenience.    Thank You!  Nw

## (undated) DEVICE — PACK BASIC

## (undated) DEVICE — DRAPE LAP TIBURON 77X122IN

## (undated) DEVICE — HEMOSTAT VASC BAND REG 24CM

## (undated) DEVICE — DRAPE ANGIO BRACH 38X44IN

## (undated) DEVICE — COVER PROBE US 5.5X58L NON LTX

## (undated) DEVICE — GAUZE SPONGE 4X4 12PLY

## (undated) DEVICE — KIT GLIDESHEATH SLEND 6FR 10CM

## (undated) DEVICE — SEE MEDLINE ITEM 152622

## (undated) DEVICE — SEE MEDLINE ITEM 157116

## (undated) DEVICE — Device

## (undated) DEVICE — COVER OVERHEAD SURG LT BLUE

## (undated) DEVICE — SUT CTD VICRYL 0 UND BR

## (undated) DEVICE — GUIDEWIRE WHOLEY STD STR 260CM

## (undated) DEVICE — ELECTRODE REM PLYHSV RETURN 9

## (undated) DEVICE — ADHESIVE DERMABOND ADVANCED

## (undated) DEVICE — HEMOSTAT VASC BAND LONG 27CM

## (undated) DEVICE — CATH ANG PIGTAIL 4FR INFINITY

## (undated) DEVICE — GLOVE SURGICAL LATEX SZ 7

## (undated) DEVICE — DRAIN PENRS STERILE LTX 18X1/2

## (undated) DEVICE — PAD DEFIB CADENCE ADULT R2

## (undated) DEVICE — SUPPORTER ADLT A3 3 IN. WB LA

## (undated) DEVICE — TUBING HPCIL ROT M/F ADPT 48IN

## (undated) DEVICE — CATH IMPULSE 5F 100CM FR4

## (undated) DEVICE — APPLICATOR CHLORAPREP ORN 26ML

## (undated) DEVICE — NDL HYPO REG 25G X 1 1/2

## (undated) DEVICE — SEE MEDLINE ITEM 156955

## (undated) DEVICE — VISIPAQUE 320 200ML +PK

## (undated) DEVICE — KIT LEFT HEART MANIFOLD CUSTOM

## (undated) DEVICE — CATH OPTITORQUE TIGER 5F 100CM

## (undated) DEVICE — SUT 2/0 30IN SILK BLK BRAI